# Patient Record
Sex: FEMALE | Race: WHITE | NOT HISPANIC OR LATINO | Employment: OTHER | URBAN - METROPOLITAN AREA
[De-identification: names, ages, dates, MRNs, and addresses within clinical notes are randomized per-mention and may not be internally consistent; named-entity substitution may affect disease eponyms.]

---

## 2023-03-26 DIAGNOSIS — E78.5 HYPERLIPIDEMIA, UNSPECIFIED: ICD-10-CM

## 2023-03-27 RX ORDER — ROSUVASTATIN CALCIUM 5 MG/1
5 TABLET, COATED ORAL DAILY
COMMUNITY
End: 2023-03-28 | Stop reason: SDUPTHER

## 2023-03-28 RX ORDER — ROSUVASTATIN CALCIUM 5 MG/1
TABLET, COATED ORAL
Qty: 90 TABLET | Refills: 3 | Status: SHIPPED | OUTPATIENT
Start: 2023-03-28 | End: 2024-06-10

## 2023-06-24 DIAGNOSIS — I10 PRIMARY HYPERTENSION: Primary | ICD-10-CM

## 2023-06-26 PROBLEM — M67.40 GANGLION, JOINT: Status: ACTIVE | Noted: 2023-06-26

## 2023-06-26 PROBLEM — M25.569 JOINT PAIN, KNEE: Status: RESOLVED | Noted: 2023-06-26 | Resolved: 2023-06-26

## 2023-06-26 PROBLEM — H81.09 MENIERE'S DISEASE: Status: ACTIVE | Noted: 2023-06-26

## 2023-06-26 PROBLEM — H53.143 ASTHENOPIA, BILATERAL: Status: RESOLVED | Noted: 2023-06-26 | Resolved: 2023-06-26

## 2023-06-26 PROBLEM — M75.42 IMPINGEMENT SYNDROME OF LEFT SHOULDER: Status: RESOLVED | Noted: 2023-06-26 | Resolved: 2023-06-26

## 2023-06-26 PROBLEM — M25.462 BILATERAL KNEE SWELLING: Status: RESOLVED | Noted: 2023-06-26 | Resolved: 2023-06-26

## 2023-06-26 PROBLEM — M17.10 OSTEOARTHRITIS, LOWER LEG, LOCALIZED: Status: ACTIVE | Noted: 2023-06-26

## 2023-06-26 PROBLEM — H52.7 REFRACTIVE ERROR: Status: RESOLVED | Noted: 2023-06-26 | Resolved: 2023-06-26

## 2023-06-26 PROBLEM — C50.912 BREAST CANCER, LEFT (MULTI): Status: RESOLVED | Noted: 2023-06-26 | Resolved: 2023-06-26

## 2023-06-26 PROBLEM — R53.83 FATIGUE: Status: ACTIVE | Noted: 2023-06-26

## 2023-06-26 PROBLEM — N63.20 LEFT BREAST MASS: Status: RESOLVED | Noted: 2023-06-26 | Resolved: 2023-06-26

## 2023-06-26 PROBLEM — R04.0 EPISTAXIS: Status: RESOLVED | Noted: 2023-06-26 | Resolved: 2023-06-26

## 2023-06-26 PROBLEM — R07.81 RIB PAIN ON LEFT SIDE: Status: RESOLVED | Noted: 2023-06-26 | Resolved: 2023-06-26

## 2023-06-26 PROBLEM — R73.9 ELEVATED BLOOD SUGAR: Status: ACTIVE | Noted: 2023-06-26

## 2023-06-26 PROBLEM — T75.3XXA MOTION SICKNESS: Status: RESOLVED | Noted: 2023-06-26 | Resolved: 2023-06-26

## 2023-06-26 PROBLEM — C73 PAPILLARY ADENOCARCINOMA OF THYROID (MULTI): Status: RESOLVED | Noted: 2023-06-26 | Resolved: 2023-06-26

## 2023-06-26 PROBLEM — F41.9 ANXIETY: Status: ACTIVE | Noted: 2023-06-26

## 2023-06-26 PROBLEM — J45.909 MILD REACTIVE AIRWAYS DISEASE (HHS-HCC): Status: RESOLVED | Noted: 2023-06-26 | Resolved: 2023-06-26

## 2023-06-26 PROBLEM — M25.519 PAIN, JOINT, SHOULDER: Status: RESOLVED | Noted: 2023-06-26 | Resolved: 2023-06-26

## 2023-06-26 PROBLEM — K02.9 DENTAL DECAY: Status: RESOLVED | Noted: 2023-06-26 | Resolved: 2023-06-26

## 2023-06-26 PROBLEM — D17.9 LIPOMA: Status: RESOLVED | Noted: 2023-06-26 | Resolved: 2023-06-26

## 2023-06-26 PROBLEM — H04.123 BILATERAL DRY EYES: Status: RESOLVED | Noted: 2023-06-26 | Resolved: 2023-06-26

## 2023-06-26 PROBLEM — M25.461 BILATERAL KNEE SWELLING: Status: RESOLVED | Noted: 2023-06-26 | Resolved: 2023-06-26

## 2023-06-26 PROBLEM — E06.3 HASHIMOTO'S THYROIDITIS: Status: ACTIVE | Noted: 2023-06-26

## 2023-06-26 PROBLEM — H52.00 HYPEROPIA WITH PRESBYOPIA: Status: RESOLVED | Noted: 2023-06-26 | Resolved: 2023-06-26

## 2023-06-26 PROBLEM — H52.4 HYPEROPIA WITH PRESBYOPIA: Status: RESOLVED | Noted: 2023-06-26 | Resolved: 2023-06-26

## 2023-06-26 PROBLEM — S39.92XA LOWER BACK INJURY: Status: RESOLVED | Noted: 2023-06-26 | Resolved: 2023-06-26

## 2023-06-26 PROBLEM — M54.16 RIGHT LUMBAR RADICULOPATHY: Status: RESOLVED | Noted: 2023-06-26 | Resolved: 2023-06-26

## 2023-06-26 PROBLEM — H81.01 COCHLEAR HYDROPS, RIGHT: Status: RESOLVED | Noted: 2023-06-26 | Resolved: 2023-06-26

## 2023-06-26 PROBLEM — L08.9 INFECTION, SKIN: Status: RESOLVED | Noted: 2023-06-26 | Resolved: 2023-06-26

## 2023-06-26 PROBLEM — R00.0 TACHYCARDIA: Status: RESOLVED | Noted: 2023-06-26 | Resolved: 2023-06-26

## 2023-06-26 PROBLEM — J45.909 ASTHMA (HHS-HCC): Status: ACTIVE | Noted: 2023-06-26

## 2023-06-26 PROBLEM — G25.89 SCAPULAR DYSKINESIS: Status: RESOLVED | Noted: 2023-06-26 | Resolved: 2023-06-26

## 2023-06-26 PROBLEM — R00.2 PALPITATIONS: Status: ACTIVE | Noted: 2023-06-26

## 2023-06-26 PROBLEM — H92.03 OTALGIA OF BOTH EARS: Status: RESOLVED | Noted: 2023-06-26 | Resolved: 2023-06-26

## 2023-06-26 PROBLEM — A69.20 LYME DISEASE: Status: RESOLVED | Noted: 2023-06-26 | Resolved: 2023-06-26

## 2023-06-26 PROBLEM — D34 THYROID FOLLICULAR ADENOMA: Status: ACTIVE | Noted: 2023-06-26

## 2023-06-26 PROBLEM — E03.9 HYPOTHYROIDISM: Status: ACTIVE | Noted: 2023-06-26

## 2023-06-26 PROBLEM — E78.5 HYPERLIPIDEMIA: Status: ACTIVE | Noted: 2023-06-26

## 2023-06-26 RX ORDER — PROPRANOLOL HYDROCHLORIDE 80 MG/1
80 CAPSULE, EXTENDED RELEASE ORAL DAILY
COMMUNITY
Start: 2023-03-24 | End: 2023-06-26 | Stop reason: SDUPTHER

## 2023-06-26 RX ORDER — LEVOTHYROXINE SODIUM 100 UG/1
100 TABLET ORAL
COMMUNITY
Start: 2023-05-03 | End: 2023-12-11 | Stop reason: WASHOUT

## 2023-06-26 RX ORDER — PROPRANOLOL HYDROCHLORIDE 80 MG/1
CAPSULE, EXTENDED RELEASE ORAL
Qty: 90 CAPSULE | Refills: 3 | Status: SHIPPED | OUTPATIENT
Start: 2023-06-26 | End: 2023-12-05 | Stop reason: SDUPTHER

## 2023-06-26 RX ORDER — HYDROCORTISONE 25 MG/G
CREAM TOPICAL
COMMUNITY
Start: 2022-07-22 | End: 2023-12-11 | Stop reason: WASHOUT

## 2023-06-26 RX ORDER — LIOTHYRONINE SODIUM 5 UG/1
15 TABLET ORAL DAILY
COMMUNITY
Start: 2022-03-03

## 2023-09-21 ENCOUNTER — OFFICE VISIT (OUTPATIENT)
Dept: PRIMARY CARE | Facility: CLINIC | Age: 68
End: 2023-09-21
Payer: MEDICARE

## 2023-09-21 VITALS
OXYGEN SATURATION: 96 % | HEIGHT: 62 IN | SYSTOLIC BLOOD PRESSURE: 118 MMHG | BODY MASS INDEX: 32.39 KG/M2 | WEIGHT: 176 LBS | DIASTOLIC BLOOD PRESSURE: 82 MMHG | TEMPERATURE: 96.5 F | HEART RATE: 86 BPM

## 2023-09-21 DIAGNOSIS — K21.9 GASTROESOPHAGEAL REFLUX DISEASE WITHOUT ESOPHAGITIS: Primary | ICD-10-CM

## 2023-09-21 PROCEDURE — 99212 OFFICE O/P EST SF 10 MIN: CPT | Performed by: INTERNAL MEDICINE

## 2023-09-21 PROCEDURE — 1160F RVW MEDS BY RX/DR IN RCRD: CPT | Performed by: INTERNAL MEDICINE

## 2023-09-21 PROCEDURE — 1159F MED LIST DOCD IN RCRD: CPT | Performed by: INTERNAL MEDICINE

## 2023-09-21 PROCEDURE — 1126F AMNT PAIN NOTED NONE PRSNT: CPT | Performed by: INTERNAL MEDICINE

## 2023-09-21 RX ORDER — OMEPRAZOLE 20 MG/1
20 CAPSULE, DELAYED RELEASE ORAL 2 TIMES DAILY
Qty: 42 CAPSULE | Refills: 0 | Status: SHIPPED | OUTPATIENT
Start: 2023-09-21 | End: 2024-02-13 | Stop reason: WASHOUT

## 2023-09-21 ASSESSMENT — ENCOUNTER SYMPTOMS
FATIGUE: 0
ADENOPATHY: 0
WEAKNESS: 0
HEADACHES: 0
DIARRHEA: 0
SPEECH DIFFICULTY: 0
STRIDOR: 0
SEIZURES: 0
FACIAL ASYMMETRY: 0
HALLUCINATIONS: 0
MYALGIAS: 0
ABDOMINAL DISTENTION: 1
COLOR CHANGE: 0
SINUS PAIN: 0
SHORTNESS OF BREATH: 0
NAUSEA: 0
ACTIVITY CHANGE: 0
CONSTIPATION: 0
TROUBLE SWALLOWING: 0
SORE THROAT: 0
EYE PAIN: 0
BACK PAIN: 0
POLYPHAGIA: 0
APPETITE CHANGE: 0
CHEST TIGHTNESS: 0
ABDOMINAL PAIN: 0
DIZZINESS: 0
FLANK PAIN: 0
VOMITING: 0
PHOTOPHOBIA: 0
COUGH: 0
HEMATURIA: 0
NUMBNESS: 0
NECK PAIN: 0
JOINT SWELLING: 0
POLYDIPSIA: 0
ARTHRALGIAS: 0
WHEEZING: 0
SLEEP DISTURBANCE: 0
PALPITATIONS: 0
VOICE CHANGE: 0
TREMORS: 0
DYSURIA: 0
NERVOUS/ANXIOUS: 0
UNEXPECTED WEIGHT CHANGE: 0
BLOOD IN STOOL: 0
WOUND: 0
FEVER: 0
CONFUSION: 0

## 2023-09-21 NOTE — PROGRESS NOTES
"Subjective   Patient ID: Joe Taylor is a 68 y.o. female who presents for No chief complaint on file..    HPI   Patient presented to the office for the symptoms of GERD and abdominal distension from past several days which is not getting better.    Her symptoms started with joint pain and fever/ chills but other symptoms resolved.    Review of Systems   Constitutional:  Negative for activity change, appetite change, fatigue, fever and unexpected weight change.   HENT:  Negative for dental problem, ear discharge, hearing loss, nosebleeds, postnasal drip, sinus pain, sore throat, trouble swallowing and voice change.    Eyes:  Negative for photophobia, pain and visual disturbance.   Respiratory:  Negative for cough, chest tightness, shortness of breath, wheezing and stridor.    Cardiovascular:  Negative for chest pain, palpitations and leg swelling.   Gastrointestinal:  Positive for abdominal distention. Negative for abdominal pain, blood in stool, constipation, diarrhea, nausea and vomiting.   Endocrine: Negative for polydipsia, polyphagia and polyuria.   Genitourinary:  Negative for decreased urine volume, dyspareunia, dysuria, flank pain, hematuria and urgency.   Musculoskeletal:  Negative for arthralgias, back pain, gait problem, joint swelling, myalgias and neck pain.   Skin:  Negative for color change, rash and wound.   Allergic/Immunologic: Negative for environmental allergies and food allergies.   Neurological:  Negative for dizziness, tremors, seizures, syncope, facial asymmetry, speech difficulty, weakness, numbness and headaches.   Hematological:  Negative for adenopathy.   Psychiatric/Behavioral:  Negative for behavioral problems, confusion, hallucinations, self-injury, sleep disturbance and suicidal ideas. The patient is not nervous/anxious.      Objective   /82   Pulse 86   Temp 35.8 °C (96.5 °F)   Ht 1.575 m (5' 2\")   Wt 79.8 kg (176 lb)   SpO2 96%   BMI 32.19 kg/m²     Physical " Exam  Constitutional:       General: She is not in acute distress.     Appearance: Normal appearance. She is not ill-appearing or toxic-appearing.   Pulmonary:      Effort: Pulmonary effort is normal.   Abdominal:      General: Bowel sounds are normal.   Musculoskeletal:         General: Normal range of motion.      Cervical back: Normal range of motion.   Skin:     General: Skin is warm.   Neurological:      General: No focal deficit present.      Mental Status: She is alert and oriented to person, place, and time.   Psychiatric:         Mood and Affect: Mood normal.         Behavior: Behavior normal.         Thought Content: Thought content normal.         Judgment: Judgment normal.       Assessment/Plan   Problem List Items Addressed This Visit    None  Visit Diagnoses       Gastroesophageal reflux disease without esophagitis    -  Primary    Relevant Medications    omeprazole (PriLOSEC) 20 mg DR capsule

## 2023-09-29 ENCOUNTER — APPOINTMENT (OUTPATIENT)
Dept: PRIMARY CARE | Facility: CLINIC | Age: 68
End: 2023-09-29
Payer: MEDICARE

## 2023-10-02 ENCOUNTER — APPOINTMENT (OUTPATIENT)
Dept: PRIMARY CARE | Facility: CLINIC | Age: 68
End: 2023-10-02
Payer: MEDICARE

## 2023-11-16 NOTE — PROGRESS NOTES
Oncology Follow-Up    Joe Taylor  [id number]       Breast         AJCC Edition: 8th (AJCC), Diagnosis Date: 13-Mar-2020, IA, pT2 pN0 cM0 G2  Oncology History    No history exists.     Treatment Synopsis:    65-year-old postmenopausal  female with left-sided invasive ductal carcinoma, stage IA (T1b N0 M0). The patient's breast cancer was diagnosed on March 13, 2020, and is estrogen receptor positive at >95%, progesterone receptor positive at 90%, and HER-2/chhaya equivocal, her2 not amplified by FISH at 1.7. In addition patient has an oncotype DX recurrence score of 14 translating to a 9-year distant recurrence  rate of 4% with <1% chemotherapy benefit. Details of her history are as follows.      02/21/20220: Bilateral screening mammogram. Showed new spiculated left breast mass.   03/13/2020: Left mammogram with US at 3:00, 9 cm from the nipple, was a 0.5 x 0.6 x 0.7cm mass. Left axilla showed one abnormal looking LN  measuring 2.4 x 0.7 x 1.9cm mass.   03/13/2020: US guided bio[psy of the left breast at 3:00, 9cm from the nipple. Pathology revealed invasive ductal carcinoma with receptors as above. Left axillary LN biopsy did not reveal any malignancy   04/08/2020: Patient completed a left partial mastectomy with SLNB. Pathology showed a 2.3cm mass, grade 2, with 0/2 SLNs involved.   06/16/2020: Completed radiation   07/01/2020: Started Arimidex      Subjective    Joe presents for her Initial survivorship visit. She was last seen in medical oncology in June 2021 though continued follow up at [facility]. Her oncologist left and was referred to me by Dr. Bindu Matias. Joe has been following with surgical oncology and primary care. Joe has been confused about her follow up visits and wants to get on a schedule where she follows up. She continues on anastrozole and asks if she can take it every other day. She sees Dr. Mack in primary care and will be seeing Dr. Varma in  endocrinology about an elevated calcium level that she has had for quite some times. Joe does monthly Breast self exams though is unsure what she is looking for. She has tenderness in her left lateral breast and lower axilla. She reports SOB due to asthma, she also saw dermatology last year with a negative exam. She will be seeing Dr. Wise in gynecology to establish care since her GYN retired 2 years ago. Joe states her  has trigeminal neuralgia. He is very dependant on her and this has been difficult for her. Joe is observed to be very anxious as she twists her hair almost continually and talks very fast about issues that happened years ago. Joe is not exercising though is considering joining a [employer] class close to her home as her [employer] class is too far and they were not compliant with Covid protocols. Greta denies any unusual headaches, balance issues, depression, cough, shortness of breath (other then mentioned above), problems swallowing, changes in chest/breast area, abdominal pain, bone or muscle pain, vaginal bleeding, rectal bleeding, blood in the urine, vaginal dryness, swelling arms or legs, new or unusual skin moles or lesions. Joe states she has gained weight in her stomach.         Objective      Vitals:    11/17/23 0853   BP: 126/83   Pulse: 82   Resp: 18   Temp: 36.5 °C (97.7 °F)   SpO2: 99%        Constitutional: Well developed, alert/oriented x3, no distress, cooperative   Eyes: clear sclera   ENMT: mucous membranes moist, no apparent lesions   Head/Neck: Neck supple, no bruits   Respiratory/Thorax: Patent airways, normal breath sounds with good chest expansion   Cardiovascular: Regular rate and rhythm, no murmurs, 2+ equal pulses of the extremities,   Gastrointestinal: Nondistended, soft, non-tender, no masses palpable, no organomegaly   Musculoskeletal: ROM intact, no joint swelling, normal strength   Extremities: normal extremities, no edema,  cyanosis, contusions or wounds   Neurological: alert and oriented x3,  normal strength   Breast:     Lymphatic: No significant lymphadenopathy   Psychological: Appropriate mood and behavior   Skin: Warm and dry, no lesions, no rashes. She does have multiple cysts though most likely fibroadenomas; the 2 that I palpated were at the center of her lower back. They are tender on palpation.      Physical Exam  Chest:          Comments: Left breast + for breast conserving surgery with well healed UOQ and left axillary incisions; no masses, nodules, skin changes, discharge. Right breast without masses, nodules, skin changes, discharge.          Lab Results   Component Value Date    WBC 3.9 (L) 11/04/2022    HGB 14.0 11/04/2022    HCT 42.3 11/04/2022    MCV 86 11/04/2022     11/04/2022       Chemistry    Lab Results   Component Value Date/Time     11/04/2022 1023    K 4.4 11/04/2022 1023     11/04/2022 1023    CO2 28 11/04/2022 1023    BUN 24 (H) 11/04/2022 1023    CREATININE 0.71 11/04/2022 1023    Lab Results   Component Value Date/Time    CALCIUM 9.9 11/04/2022 1023    ALKPHOS 109 11/04/2022 1023    AST 23 11/04/2022 1023    ALT 29 11/04/2022 1023    BILITOT 1.0 11/04/2022 1023              Imaging:  MRN: [id number]  Patient Name: YESICA LAGUNAS     STUDY:  DIGITAL DIAG MAMM BILAT WITH MAEVE;  5/19/2023 11:12 am     ACCESSION NUMBER(S):  [id number]     ORDERING CLINICIAN:  DRU MARTINEZ     INDICATION:  Annual. History of left lumpectomy with radiation.     COMPARISON:  05/04/2022, 04/14/2021, 02/21/2020, 12/12/2017     FINDINGS:  MAMMOGRAPHY: 2D and tomosynthesis images were reviewed at 1 mm slice  thickness.     The breast tissue is heterogeneously dense, which may obscure small  masses.  There is stable parenchymal scarring with associated  surgical clips in the upper outer left breast at posterior depth from  prior lumpectomy. Mild left breast skin and trabecular thickening is  present consistent with  "prior radiation therapy. Stable parenchymal  scarring is also present in the left axilla. No suspicious masses or  calcifications are identified in either breast.     IMPRESSION:  Stable left breast post treatment changes. No mammographic evidence  of malignancy.     BI-RADS CATEGORY:     Category: 2 - Benign.  Recommendation: 1 Year Screening        Assessment/Plan    Joe is a 67 yo woman with a hx of T2N0 left IDC diagnosed in March 2020. She is s/p partial mastectomy and SLNB, XRT and initiated anastrozole in July 2020.   There are no diagnoses linked to this encounter.  Plan:  Exam is negative.  Continue anastrozole 1mg daily. Clinical trials were based on 5 or 10 years of daily use. She should take it every day.  The \"lumps\" seem to be fibroadenomas, explained that they are fatty tissue. Please have Dr. Mack examine them as well, or Dr. Matias at your next visit. I don't think there is anything to do for them unless they become very big.  Discuss your osteopenia with Dr. Varma at your upcoming visit. We can consider treating the osteopenia with Zometa, the reasons were discussed, or, Dr. Varma may want further testing to find the cause of the hypercalcemia.  We discussed her situation with her  and a co-dependant relationship. I recommended therapy and referred her to [facility].   Explained the reason for post-menonpausal weight gain which is due to smaller amount of estrogen and muscles relax due to this which can cause a paunch.   Encouraged her to join an exercise class 2-3 times/week as she has had success with that in the past.   Recommended updating her shingles vaccines. She plans to have her flu shot with Dr. Mack at her appointment next month. She should consider updating her Covid vaccine; I did explain why she didn't feel well after her last two injections.  Encouraged monthly breast self exams, plant based diet, keep alcohol <3 drinks/week, exercise at least 2.5 " hours/week.  We reviewed signs/symptoms of recurrence including new masses, new pigmented lesion, tugging or pulling of the skin, nipple discharge, rash in or around the chest area, or any new finding that doesn't resolve within a 2-3 weeks.  All of Joe's questions/concerns were addressed.  Over 35 minutes of time was spent with this patient with >50% of the time with education, counseling, and coordination of care.  She will see Dr. Matias in late May. I will see her back in one year.         Theresa Monroe, APRN-CNP

## 2023-11-17 ENCOUNTER — OFFICE VISIT (OUTPATIENT)
Dept: HEMATOLOGY/ONCOLOGY | Facility: CLINIC | Age: 68
End: 2023-11-17
Payer: MEDICARE

## 2023-11-17 VITALS
DIASTOLIC BLOOD PRESSURE: 83 MMHG | RESPIRATION RATE: 18 BRPM | OXYGEN SATURATION: 99 % | TEMPERATURE: 97.7 F | BODY MASS INDEX: 32.18 KG/M2 | HEART RATE: 82 BPM | WEIGHT: 175.93 LBS | SYSTOLIC BLOOD PRESSURE: 126 MMHG

## 2023-11-17 DIAGNOSIS — Z85.3 HISTORY OF RIGHT BREAST CANCER: Primary | ICD-10-CM

## 2023-11-17 PROCEDURE — 99417 PROLNG OP E/M EACH 15 MIN: CPT | Performed by: NURSE PRACTITIONER

## 2023-11-17 PROCEDURE — 99215 OFFICE O/P EST HI 40 MIN: CPT | Performed by: NURSE PRACTITIONER

## 2023-11-17 PROCEDURE — 1160F RVW MEDS BY RX/DR IN RCRD: CPT | Performed by: NURSE PRACTITIONER

## 2023-11-17 PROCEDURE — 1159F MED LIST DOCD IN RCRD: CPT | Performed by: NURSE PRACTITIONER

## 2023-11-17 PROCEDURE — 1036F TOBACCO NON-USER: CPT | Performed by: NURSE PRACTITIONER

## 2023-11-17 PROCEDURE — 1126F AMNT PAIN NOTED NONE PRSNT: CPT | Performed by: NURSE PRACTITIONER

## 2023-11-17 RX ORDER — ANASTROZOLE 1 MG/1
1 TABLET ORAL DAILY
COMMUNITY
End: 2024-01-31 | Stop reason: SDUPTHER

## 2023-11-17 ASSESSMENT — PAIN SCALES - GENERAL: PAINLEVEL: 0-NO PAIN

## 2023-11-17 ASSESSMENT — ENCOUNTER SYMPTOMS
OCCASIONAL FEELINGS OF UNSTEADINESS: 0
DEPRESSION: 0
LOSS OF SENSATION IN FEET: 0

## 2023-11-17 NOTE — PATIENT INSTRUCTIONS
1. Exercise 2.5 hours per week; bone strengthening, cardio-vascular, resistance training.  2. Please do self breast exams monthly.  3. Keep alcohol under 3 drinks per week.  4. Sun safety - limit sun exposure from 11a-2p when its at its hottest, apply 15-30 sun block and re-apply every 1-2 hours if perspiring or swimming.  5. Eat a plant based diet, add in oily fishes such as mackerel, tuna, and salmon.  6. Get in at least 1,000 mg of calcium per day through diet or supplement for bone strength. Examples of foods higher in calcium are milk, yogurt, fruited yogurt, oranges, fortified orange juice, almonds, almond milk, broccoli, spinach, bok courtney, mustard greens, puddings, custards, ice cream, fortified cereals, bars, and crackers.   7. Continue anastrozole 1mg daily.  8. See Dr. Varma about your elevated calcium level and osteopenia. I do recommend treatment with Zometa infusions twice yearly.   9. Consider seeing Elis Rapp for counseling.  10. Please call the office if any new mass or rash in or around breast, or any uncontrolled symptoms that last over 2-3 weeks at [phone].  11. Have your vaccines updated.  12. See Dr. Matias with your mammogram in late May. I will see you back in one year.   10. It was nice meeting you today, bonny.   Have a Happy, Healthy, Holiday Season!  Thank you for choosing [facility] for your care.

## 2023-11-24 ENCOUNTER — APPOINTMENT (OUTPATIENT)
Dept: OBSTETRICS AND GYNECOLOGY | Facility: CLINIC | Age: 68
End: 2023-11-24
Payer: MEDICARE

## 2023-11-30 ENCOUNTER — APPOINTMENT (OUTPATIENT)
Dept: PRIMARY CARE | Facility: CLINIC | Age: 68
End: 2023-11-30
Payer: MEDICARE

## 2023-12-05 ENCOUNTER — OFFICE VISIT (OUTPATIENT)
Dept: PRIMARY CARE | Facility: CLINIC | Age: 68
End: 2023-12-05
Payer: MEDICARE

## 2023-12-05 ENCOUNTER — TELEPHONE (OUTPATIENT)
Dept: PRIMARY CARE | Facility: CLINIC | Age: 68
End: 2023-12-05

## 2023-12-05 VITALS
SYSTOLIC BLOOD PRESSURE: 140 MMHG | DIASTOLIC BLOOD PRESSURE: 80 MMHG | WEIGHT: 175 LBS | TEMPERATURE: 96.6 F | OXYGEN SATURATION: 99 % | HEIGHT: 62 IN | BODY MASS INDEX: 32.2 KG/M2 | HEART RATE: 90 BPM

## 2023-12-05 DIAGNOSIS — R00.2 PALPITATIONS: ICD-10-CM

## 2023-12-05 DIAGNOSIS — E03.9 ACQUIRED HYPOTHYROIDISM: ICD-10-CM

## 2023-12-05 DIAGNOSIS — I10 PRIMARY HYPERTENSION: ICD-10-CM

## 2023-12-05 DIAGNOSIS — Z00.00 ROUTINE GENERAL MEDICAL EXAMINATION AT HEALTH CARE FACILITY: ICD-10-CM

## 2023-12-05 DIAGNOSIS — D34 THYROID FOLLICULAR ADENOMA: ICD-10-CM

## 2023-12-05 DIAGNOSIS — Z00.00 ROUTINE GENERAL MEDICAL EXAMINATION AT A HEALTH CARE FACILITY: Primary | ICD-10-CM

## 2023-12-05 PROCEDURE — 99213 OFFICE O/P EST LOW 20 MIN: CPT | Performed by: FAMILY MEDICINE

## 2023-12-05 PROCEDURE — G0439 PPPS, SUBSEQ VISIT: HCPCS | Performed by: FAMILY MEDICINE

## 2023-12-05 PROCEDURE — 1170F FXNL STATUS ASSESSED: CPT | Performed by: FAMILY MEDICINE

## 2023-12-05 PROCEDURE — 1160F RVW MEDS BY RX/DR IN RCRD: CPT | Performed by: FAMILY MEDICINE

## 2023-12-05 PROCEDURE — 3079F DIAST BP 80-89 MM HG: CPT | Performed by: FAMILY MEDICINE

## 2023-12-05 PROCEDURE — 1159F MED LIST DOCD IN RCRD: CPT | Performed by: FAMILY MEDICINE

## 2023-12-05 PROCEDURE — 1036F TOBACCO NON-USER: CPT | Performed by: FAMILY MEDICINE

## 2023-12-05 PROCEDURE — 1126F AMNT PAIN NOTED NONE PRSNT: CPT | Performed by: FAMILY MEDICINE

## 2023-12-05 PROCEDURE — 3077F SYST BP >= 140 MM HG: CPT | Performed by: FAMILY MEDICINE

## 2023-12-05 RX ORDER — ALPRAZOLAM 0.25 MG/1
0.25 TABLET ORAL 3 TIMES DAILY PRN
Qty: 30 TABLET | Refills: 5 | Status: SHIPPED | OUTPATIENT
Start: 2023-12-05 | End: 2023-12-11 | Stop reason: WASHOUT

## 2023-12-05 RX ORDER — ALPRAZOLAM 0.25 MG/1
0.25 TABLET ORAL 3 TIMES DAILY PRN
Qty: 30 TABLET | Refills: 5 | Status: SHIPPED | OUTPATIENT
Start: 2023-12-05 | End: 2023-12-05 | Stop reason: SDUPTHER

## 2023-12-05 RX ORDER — PROPRANOLOL HYDROCHLORIDE 80 MG/1
80 CAPSULE, EXTENDED RELEASE ORAL DAILY
Qty: 90 CAPSULE | Refills: 3 | Status: SHIPPED | OUTPATIENT
Start: 2023-12-05 | End: 2023-12-05 | Stop reason: SDUPTHER

## 2023-12-05 RX ORDER — PROPRANOLOL HYDROCHLORIDE 80 MG/1
80 CAPSULE, EXTENDED RELEASE ORAL DAILY
Qty: 90 CAPSULE | Refills: 3 | Status: SHIPPED | OUTPATIENT
Start: 2023-12-05 | End: 2024-06-10

## 2023-12-05 ASSESSMENT — ENCOUNTER SYMPTOMS
FREQUENCY: 0
UNEXPECTED WEIGHT CHANGE: 0
SHORTNESS OF BREATH: 0
JOINT SWELLING: 0
VOMITING: 0
FEVER: 0
BLOOD IN STOOL: 0
PALPITATIONS: 1
DIARRHEA: 0
EYE PAIN: 0
HALLUCINATIONS: 0
DYSURIA: 0
FATIGUE: 0
HEMATURIA: 0
DECREASED CONCENTRATION: 0
TROUBLE SWALLOWING: 0
ABDOMINAL PAIN: 0
NAUSEA: 0
NUMBNESS: 0
DIZZINESS: 0
CONFUSION: 0
WEAKNESS: 0
SORE THROAT: 0
COUGH: 0
HEADACHES: 0

## 2023-12-05 ASSESSMENT — ACTIVITIES OF DAILY LIVING (ADL)
GROCERY_SHOPPING: INDEPENDENT
BATHING: INDEPENDENT
MANAGING_FINANCES: INDEPENDENT
TAKING_MEDICATION: INDEPENDENT
DRESSING: INDEPENDENT
DOING_HOUSEWORK: INDEPENDENT

## 2023-12-05 NOTE — PATIENT INSTRUCTIONS
It was nice to see you today!  Discussed current concerns and addressed   Reviewed recent labs and diagnostics  Reviewed medications list  Continue to eat a healthy diet, exercise at least 3 times a week or more  Plan and follow up discussed  For any further information related to your condition, copy and paste or go to [email or web address]    We are going to hold off on vaccine for now, flu shot.  I am going to recheck her thyroid studies and if abnormal we will notify her endocrinologist.  The fast heart rate is coinciding with the new propranolol generic.  I will see if [facility] can get her old formulation if not she may want to go to [facility] before we try other things.  I will give her prescription of Xanax to help her sleep and calm her down.  I am going to get her back into Dr. Rasmussen to see if she needs further restratification.  Denies anxiety outside of her normal everyday routine with her  that has indecipherable trigeminal neuralgia.  Will get labs that endocrine did not get.

## 2023-12-05 NOTE — PROGRESS NOTES
Subjective   Reason for Visit: Joe Taylor is an 68 y.o. female here for a Medicare Wellness visit.          Reviewed all medications by prescribing practitioner or clinical pharmacist (such as prescriptions, OTCs, herbal therapies and supplements) and documented in the medical record.    Patient is due for Medicare wellness and highlights and necessary items were addressed but her big issue is that she has been having what she calls a racing heart.  She can feel it beating intensely for the past week or so.  She has a history of thyroid cancer and her endocrinologist runs her high.  They lowered her dose on October 6 when her TSH was suppressed.  She was doing fine until she picked up a new formulation of propranolol at a new pharmacy.  The pill was different.  Whether coincidental or not she has not felt well since then.  The palpitations/racing heart wakes her up out of sleep.  She is under no more stress than she has been in the past.  Her hands have been a little tingly.  She saw cardiology a few years ago and has not been back.  She has no chest pain.  She is not a smoker.  She does not drink.        Patient Care Team:  Elise Mack MD as PCP - General  Elise Mack MD as PCP - [employer] PCP     Review of Systems   Constitutional:  Negative for fatigue, fever and unexpected weight change.   HENT:  Negative for congestion, ear pain, hearing loss, sore throat and trouble swallowing.    Eyes:  Negative for pain and visual disturbance.   Respiratory:  Negative for cough and shortness of breath.    Cardiovascular:  Positive for palpitations. Negative for chest pain and leg swelling.   Gastrointestinal:  Negative for abdominal pain, blood in stool, diarrhea, nausea and vomiting.   Genitourinary:  Negative for dysuria, frequency, hematuria and urgency.   Musculoskeletal:  Negative for joint swelling.   Skin:  Negative for pallor and rash.   Neurological:  Negative for dizziness,  "syncope, weakness, numbness and headaches.   Psychiatric/Behavioral:  Negative for confusion, decreased concentration, hallucinations and suicidal ideas.        Objective   Vitals:  /80   Pulse 90   Temp 35.9 °C (96.6 °F)   Ht 1.575 m (5' 2\")   Wt 79.4 kg (175 lb)   SpO2 99%   BMI 32.01 kg/m²       Physical Exam  Constitutional:       Appearance: Normal appearance.   Cardiovascular:      Rate and Rhythm: Normal rate and regular rhythm.      Heart sounds: Normal heart sounds.      Comments: Heart is regular, it is an the 90s.  Pulmonary:      Effort: Pulmonary effort is normal.      Breath sounds: Normal breath sounds.   Musculoskeletal:      Cervical back: Neck supple.   Skin:     General: Skin is warm and dry.   Neurological:      General: No focal deficit present.      Mental Status: She is alert.   Psychiatric:         Mood and Affect: Mood normal.         Speech: Speech normal.         Behavior: Behavior normal.         Cognition and Memory: Cognition normal.         Assessment/Plan   Problem List Items Addressed This Visit       Hypothyroidism    Relevant Orders    TSH    T3, free    T4, free    Palpitations    Relevant Orders    Referral to Cardiology    Thyroid follicular adenoma - Primary    Relevant Orders    CBC and Auto Differential     Other Visit Diagnoses       Primary hypertension        Relevant Orders    Lipid panel               "

## 2023-12-06 ENCOUNTER — LAB (OUTPATIENT)
Dept: LAB | Facility: LAB | Age: 68
End: 2023-12-06
Payer: MEDICARE

## 2023-12-06 ENCOUNTER — APPOINTMENT (OUTPATIENT)
Dept: CARDIOLOGY | Facility: HOSPITAL | Age: 68
End: 2023-12-06
Payer: MEDICARE

## 2023-12-06 DIAGNOSIS — I10 PRIMARY HYPERTENSION: ICD-10-CM

## 2023-12-06 DIAGNOSIS — E03.9 ACQUIRED HYPOTHYROIDISM: ICD-10-CM

## 2023-12-06 DIAGNOSIS — D34 THYROID FOLLICULAR ADENOMA: ICD-10-CM

## 2023-12-06 LAB
BASOPHILS # BLD AUTO: 0.03 X10*3/UL (ref 0–0.1)
BASOPHILS NFR BLD AUTO: 0.7 %
CHOLEST SERPL-MCNC: 154 MG/DL (ref 0–199)
CHOLESTEROL/HDL RATIO: 2.7
EOSINOPHIL # BLD AUTO: 0.15 X10*3/UL (ref 0–0.7)
EOSINOPHIL NFR BLD AUTO: 3.5 %
ERYTHROCYTE [DISTWIDTH] IN BLOOD BY AUTOMATED COUNT: 14.6 % (ref 11.5–14.5)
HCT VFR BLD AUTO: 43 % (ref 36–46)
HDLC SERPL-MCNC: 57.5 MG/DL
HGB BLD-MCNC: 13.6 G/DL (ref 12–16)
IMM GRANULOCYTES # BLD AUTO: 0.01 X10*3/UL (ref 0–0.7)
IMM GRANULOCYTES NFR BLD AUTO: 0.2 % (ref 0–0.9)
LDLC SERPL CALC-MCNC: 82 MG/DL
LYMPHOCYTES # BLD AUTO: 1.29 X10*3/UL (ref 1.2–4.8)
LYMPHOCYTES NFR BLD AUTO: 30.1 %
MCH RBC QN AUTO: 27.3 PG (ref 26–34)
MCHC RBC AUTO-ENTMCNC: 31.6 G/DL (ref 32–36)
MCV RBC AUTO: 86 FL (ref 80–100)
MONOCYTES # BLD AUTO: 0.4 X10*3/UL (ref 0.1–1)
MONOCYTES NFR BLD AUTO: 9.3 %
NEUTROPHILS # BLD AUTO: 2.4 X10*3/UL (ref 1.2–7.7)
NEUTROPHILS NFR BLD AUTO: 56.2 %
NON HDL CHOLESTEROL: 97 MG/DL (ref 0–149)
NRBC BLD-RTO: 0 /100 WBCS (ref 0–0)
PLATELET # BLD AUTO: 223 X10*3/UL (ref 150–450)
RBC # BLD AUTO: 4.98 X10*6/UL (ref 4–5.2)
T3FREE SERPL-MCNC: 4.3 PG/ML (ref 2.3–4.2)
T4 FREE SERPL-MCNC: 0.82 NG/DL (ref 0.61–1.12)
TRIGL SERPL-MCNC: 75 MG/DL (ref 0–149)
TSH SERPL-ACNC: 0.2 MIU/L (ref 0.44–3.98)
VLDL: 15 MG/DL (ref 0–40)
WBC # BLD AUTO: 4.3 X10*3/UL (ref 4.4–11.3)

## 2023-12-06 PROCEDURE — 85025 COMPLETE CBC W/AUTO DIFF WBC: CPT

## 2023-12-06 PROCEDURE — 84439 ASSAY OF FREE THYROXINE: CPT

## 2023-12-06 PROCEDURE — 84443 ASSAY THYROID STIM HORMONE: CPT

## 2023-12-06 PROCEDURE — 84481 FREE ASSAY (FT-3): CPT

## 2023-12-06 PROCEDURE — 80061 LIPID PANEL: CPT

## 2023-12-06 PROCEDURE — 36415 COLL VENOUS BLD VENIPUNCTURE: CPT

## 2023-12-07 ENCOUNTER — APPOINTMENT (OUTPATIENT)
Dept: PRIMARY CARE | Facility: CLINIC | Age: 68
End: 2023-12-07
Payer: MEDICARE

## 2023-12-11 ENCOUNTER — OFFICE VISIT (OUTPATIENT)
Dept: CARDIOLOGY | Facility: HOSPITAL | Age: 68
End: 2023-12-11
Payer: MEDICARE

## 2023-12-11 VITALS
OXYGEN SATURATION: 94 % | WEIGHT: 176.37 LBS | BODY MASS INDEX: 32.26 KG/M2 | HEART RATE: 88 BPM | DIASTOLIC BLOOD PRESSURE: 86 MMHG | SYSTOLIC BLOOD PRESSURE: 134 MMHG

## 2023-12-11 DIAGNOSIS — R09.89 BRUIT OF LEFT CAROTID ARTERY: ICD-10-CM

## 2023-12-11 DIAGNOSIS — R06.02 SHORTNESS OF BREATH: ICD-10-CM

## 2023-12-11 DIAGNOSIS — R00.2 PALPITATIONS: Primary | ICD-10-CM

## 2023-12-11 DIAGNOSIS — R00.2 HEART PALPITATIONS: ICD-10-CM

## 2023-12-11 PROCEDURE — 1126F AMNT PAIN NOTED NONE PRSNT: CPT | Performed by: NURSE PRACTITIONER

## 2023-12-11 PROCEDURE — 99214 OFFICE O/P EST MOD 30 MIN: CPT | Mod: 25 | Performed by: NURSE PRACTITIONER

## 2023-12-11 PROCEDURE — 93010 ELECTROCARDIOGRAM REPORT: CPT | Performed by: INTERNAL MEDICINE

## 2023-12-11 PROCEDURE — 1160F RVW MEDS BY RX/DR IN RCRD: CPT | Performed by: NURSE PRACTITIONER

## 2023-12-11 PROCEDURE — 1036F TOBACCO NON-USER: CPT | Performed by: NURSE PRACTITIONER

## 2023-12-11 PROCEDURE — 1159F MED LIST DOCD IN RCRD: CPT | Performed by: NURSE PRACTITIONER

## 2023-12-11 PROCEDURE — 99204 OFFICE O/P NEW MOD 45 MIN: CPT | Performed by: NURSE PRACTITIONER

## 2023-12-11 PROCEDURE — 93005 ELECTROCARDIOGRAM TRACING: CPT | Performed by: NURSE PRACTITIONER

## 2023-12-11 RX ORDER — PNV NO.95/FERROUS FUM/FOLIC AC 28MG-0.8MG
400 TABLET ORAL DAILY
COMMUNITY
End: 2024-02-12 | Stop reason: WASHOUT

## 2023-12-11 RX ORDER — MULTIVIT WITH MINERALS/HERBS
1 TABLET ORAL DAILY
COMMUNITY

## 2023-12-11 RX ORDER — ERGOCALCIFEROL 1.25 MG/1
1 CAPSULE ORAL
COMMUNITY
Start: 2018-04-04 | End: 2024-02-12

## 2023-12-11 RX ORDER — LEVOTHYROXINE SODIUM 75 UG/1
75 TABLET ORAL DAILY
COMMUNITY

## 2023-12-11 RX ORDER — AZELASTINE 1 MG/ML
2 SPRAY, METERED NASAL 2 TIMES DAILY
COMMUNITY
Start: 2022-08-09 | End: 2024-02-12

## 2023-12-11 ASSESSMENT — ENCOUNTER SYMPTOMS
PSYCHIATRIC NEGATIVE: 1
EYES NEGATIVE: 1
GASTROINTESTINAL NEGATIVE: 1
DEPRESSION: 0
HEMATOLOGIC/LYMPHATIC NEGATIVE: 1
RESPIRATORY NEGATIVE: 1
ENDOCRINE NEGATIVE: 1
NEUROLOGICAL NEGATIVE: 1
CONSTITUTIONAL NEGATIVE: 1
CARDIOVASCULAR NEGATIVE: 1

## 2023-12-11 NOTE — PROGRESS NOTES
Referred by Dr. Key ref. provider found for New Patient Visit (Establish care with new cardiologist.) and Rapid Heart Rate     History Of Present Illness:    Dear Dr. Mack,    I had the pleasure of meeting Mrs. Taylor today at [facility] for evaluation of heart palpitations. The patient is seen in collaboration with Dr. Hilario. Mrs. Taylor is a very pleasant 68 year old female with a history of GERD, appendectomy, thyroidectomy, and HLD, she denies history of smoking. Mom had a history of CVA, and dad had a history of carotid artery stenosis. She feels her heart is racing often, will notice all day. She will wake up with a racing heart. She has noticed the color of her Propanolol tablet has changed color. She has tried a higher dose of Propanol and was unable to tolerate it. She has tried Metoprolol and Atenolol in the past, did not work. 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 States she has increased stress at home caring for her .     Review of Systems   Constitutional: Negative.   HENT: Negative.     Eyes: Negative.    Cardiovascular: Negative.    Respiratory: Negative.     Endocrine: Negative.    Hematologic/Lymphatic: Negative.    Skin: Negative.    Gastrointestinal: Negative.    Neurological: Negative.    Psychiatric/Behavioral: Negative.          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), Disorder of thyroid, unspecified, Epistaxis (06/26/2023), Hyperopia with presbyopia (06/26/2023), Impingement syndrome of left shoulder (06/26/2023), Infection, skin (06/26/2023), Joint pain, knee (06/26/2023), Left breast mass (06/26/2023), Lipoma (06/26/2023), Mild reactive airways disease (06/26/2023), Motion sickness (06/26/2023), Otalgia of both ears (06/26/2023), Other conditions influencing health status,  Other conditions influencing health status, Pain, joint, shoulder (06/26/2023), 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    Past Surgical History:  She has a past surgical history that includes Appendectomy (11/22/2013); Other surgical history (11/22/2013); and Total thyroidectomy (03/26/2014).      Social History:  She reports that she has never smoked. She has never used smokeless tobacco. She reports that she does not currently use alcohol. She reports that she does not use drugs.    Family History:  Family History   Problem Relation Name Age of Onset    Stroke Mother      Aneurysm Father          Allergies:  Sulfa (sulfonamide antibiotics), Codeine, Mold, and Tree and shrub pollen    Outpatient Medications:  Current Outpatient Medications   Medication Instructions    alpha tocopherol (VITAMIN E) 400 Units, oral, Daily    anastrozole (ARIMIDEX) 1 mg, oral, Daily, Swallow whole with a drink of water.    azelastine (Astelin) 137 mcg (0.1 %) nasal spray 2 sprays, Each Nostril, 2 times daily    ergocalciferol (Vitamin D-2) 1.25 MG (27774 UT) capsule 1 capsule, oral, Weekly    levothyroxine (SYNTHROID, LEVOXYL) 75 mcg, oral, Daily    liothyronine (CYTOMEL) 15 mcg, oral, Daily    omeprazole (PRILOSEC) 20 mg, oral, 2 times daily, Do not crush or chew.    propranolol LA (INDERAL LA) 80 mg, oral, Daily, Do not crush, chew, or split.    rosuvastatin (Crestor) 5 mg tablet TAKE 1 TABLET BY MOUTH EVERY DAY    vitamin B complex (B Complex-Vitamin B12) tablet 1 tablet, oral,  Daily        Last Recorded Vitals:  Vitals:    12/11/23 1506   BP: 134/86   Pulse: 88   SpO2: 94%   Weight: 80 kg (176 lb 5.9 oz)       Physical Exam:  Physical Exam  Vitals reviewed.   HENT:      Head: Normocephalic.      Nose: Nose normal.   Eyes:      Pupils: Pupils are equal, round, and reactive to light.   Cardiovascular:      Rate and Rhythm: Normal rate and regular rhythm.   Pulmonary:      Effort: Pulmonary effort is normal.      Breath sounds: Normal breath sounds.   Abdominal:      General: Abdomen is flat.      Palpations: Abdomen is soft.   Musculoskeletal:         General: Normal range of motion.      Cervical back: Normal range of motion.   Skin:     General: Skin is warm and dry.   Neurological:      General: No focal deficit present.      Mental Status: He is alert and oriented to person, place, and time.   Psychiatric:         Mood and Affect: Mood normal.            Last Labs:  CBC -  Lab Results   Component Value Date    WBC 4.3 (L) 12/06/2023    HGB 13.6 12/06/2023    HCT 43.0 12/06/2023    MCV 86 12/06/2023     12/06/2023       CMP -  Lab Results   Component Value Date    CALCIUM 9.9 11/04/2022    PHOS 4.1 11/15/2017    PROT 7.4 11/04/2022    ALBUMIN 4.6 11/04/2022    AST 23 11/04/2022    ALT 29 11/04/2022    ALKPHOS 109 11/04/2022    BILITOT 1.0 11/04/2022       LIPID PANEL -   Lab Results   Component Value Date    CHOL 154 12/06/2023    TRIG 75 12/06/2023    HDL 57.5 12/06/2023    CHHDL 2.7 12/06/2023    LDLF 98 11/04/2022    VLDL 15 12/06/2023    NHDL 97 12/06/2023       RENAL FUNCTION PANEL -   Lab Results   Component Value Date    GLUCOSE 106 (H) 11/04/2022     11/04/2022    K 4.4 11/04/2022     11/04/2022    CO2 28 11/04/2022    ANIONGAP 12 11/04/2022    BUN 24 (H) 11/04/2022    CREATININE 0.71 11/04/2022    CALCIUM 9.9 11/04/2022    PHOS 4.1 11/15/2017    ALBUMIN 4.6 11/04/2022        Lab Results   Component Value Date    HGBA1C 5.6 11/04/2022    HGBA1C 5.6 08/14/2020        Last Cardiology Tests:  ECG:  EKG independently reviewed from today showed sinus rhythm heart rate 86 bpm     Echo:    Ejection Fractions:  Cath:    Stress Test:    Cardiac Imaging:  CT calcium score 2020   LM: 0.  LAD: 109.9. Previously 37.3.  LCx: 72.9. Previously 28.7.  RCA: 40.1. Previously 12.5.     Total: 222.9. Previously 78.4.    Assessment/Plan   Mrs. Taylor is a very pleasant 68 year old female with a history of HLD and GERD, complains of frequent heart palpitations that last all day. She will have a heart monitor to evaluate for an arrhythmia, and an echocardiogram to evaluate the LV function. Continues to stay active working with the dog and horse. Heart rate and blood pressure are well controlled today.     Plan   -call with any questions   -carotid ultrasound   -echocardiogram   -heart monitor for two weeks   -will call to review the results   -continue Propanolol and Crestor     I appreciate the opportunity to participate in the patient's care, please call with any questions         Joyce Templeton, YASSINE-CNP

## 2023-12-11 NOTE — PATIENT INSTRUCTIONS
CALL WITH ANY QUESTIONS   NO MEDICATION CHANGES   CAROTID ULTRASOUND   ECHOCARDIOGRAM   HEART MONITOR FOR TWO WEEKS   WILL CALL TO REVIEW THE RESULTS

## 2023-12-22 ENCOUNTER — APPOINTMENT (OUTPATIENT)
Dept: PRIMARY CARE | Facility: CLINIC | Age: 68
End: 2023-12-22
Payer: MEDICARE

## 2023-12-27 ENCOUNTER — HOSPITAL ENCOUNTER (OUTPATIENT)
Dept: CARDIOLOGY | Facility: HOSPITAL | Age: 68
Discharge: HOME | End: 2023-12-27
Payer: MEDICARE

## 2023-12-27 ENCOUNTER — HOSPITAL ENCOUNTER (OUTPATIENT)
Dept: VASCULAR MEDICINE | Facility: HOSPITAL | Age: 68
Discharge: HOME | End: 2023-12-27
Payer: MEDICARE

## 2023-12-27 DIAGNOSIS — R00.2 HEART PALPITATIONS: ICD-10-CM

## 2023-12-27 DIAGNOSIS — R06.02 SHORTNESS OF BREATH: ICD-10-CM

## 2023-12-27 DIAGNOSIS — R09.89 BRUIT OF LEFT CAROTID ARTERY: ICD-10-CM

## 2023-12-27 PROCEDURE — 93880 EXTRACRANIAL BILAT STUDY: CPT

## 2023-12-27 PROCEDURE — 93306 TTE W/DOPPLER COMPLETE: CPT

## 2023-12-27 PROCEDURE — 93246 EXT ECG>7D<15D RECORDING: CPT

## 2023-12-27 PROCEDURE — 93880 EXTRACRANIAL BILAT STUDY: CPT | Performed by: INTERNAL MEDICINE

## 2023-12-27 PROCEDURE — 2500000004 HC RX 250 GENERAL PHARMACY W/ HCPCS (ALT 636 FOR OP/ED): Performed by: STUDENT IN AN ORGANIZED HEALTH CARE EDUCATION/TRAINING PROGRAM

## 2023-12-27 PROCEDURE — 93306 TTE W/DOPPLER COMPLETE: CPT | Performed by: INTERNAL MEDICINE

## 2023-12-27 RX ADMIN — PERFLUTREN 1 ML OF DILUTION: 6.52 INJECTION, SUSPENSION INTRAVENOUS at 10:45

## 2023-12-30 LAB
AORTIC VALVE MEAN GRADIENT: 3
AORTIC VALVE PEAK VELOCITY: 1.14
AV PEAK GRADIENT: 5.2
AVA (PEAK VEL): 2.72
AVA (VTI): 2.58
EJECTION FRACTION APICAL 4 CHAMBER: 60.5
EJECTION FRACTION: 62
LEFT ATRIUM VOLUME AREA LENGTH INDEX BSA: 16.4
LEFT VENTRICLE INTERNAL DIMENSION DIASTOLE: 4.19 (ref 3.5–6)
LEFT VENTRICULAR OUTFLOW TRACT DIAMETER: 2
MITRAL VALVE E/A RATIO: 0.74
MITRAL VALVE E/E' RATIO: 6.65
RIGHT VENTRICLE FREE WALL PEAK S': 10.3
RIGHT VENTRICLE PEAK SYSTOLIC PRESSURE: 22.3
TRICUSPID ANNULAR PLANE SYSTOLIC EXCURSION: 2.2

## 2024-01-02 ENCOUNTER — TELEPHONE (OUTPATIENT)
Dept: CARDIOLOGY | Facility: HOSPITAL | Age: 69
End: 2024-01-02
Payer: MEDICARE

## 2024-01-02 NOTE — TELEPHONE ENCOUNTER
----- Message from SANDEEP Loredo sent at 1/2/2024  8:04 AM EST -----  Please call and let her know her carotid ultrasound is normal   Thanks   ----- Message -----  From: Elysia Syngo - Cardiology Results In  Sent: 12/27/2023   1:32 PM EST  To: SANDEEP Loredo

## 2024-01-31 DIAGNOSIS — C50.919 MALIGNANT NEOPLASM OF FEMALE BREAST, UNSPECIFIED ESTROGEN RECEPTOR STATUS, UNSPECIFIED LATERALITY, UNSPECIFIED SITE OF BREAST (MULTI): ICD-10-CM

## 2024-01-31 RX ORDER — ANASTROZOLE 1 MG/1
1 TABLET ORAL DAILY
Qty: 90 TABLET | Refills: 3 | Status: SHIPPED | OUTPATIENT
Start: 2024-01-31

## 2024-02-12 ENCOUNTER — OFFICE VISIT (OUTPATIENT)
Dept: OBSTETRICS AND GYNECOLOGY | Facility: CLINIC | Age: 69
End: 2024-02-12
Payer: MEDICARE

## 2024-02-12 VITALS
DIASTOLIC BLOOD PRESSURE: 71 MMHG | HEIGHT: 61 IN | SYSTOLIC BLOOD PRESSURE: 112 MMHG | BODY MASS INDEX: 33.04 KG/M2 | WEIGHT: 175 LBS

## 2024-02-12 DIAGNOSIS — M85.80 OSTEOPENIA, UNSPECIFIED LOCATION: ICD-10-CM

## 2024-02-12 DIAGNOSIS — N90.4 LICHEN SCLEROSUS OF FEMALE GENITALIA: Primary | ICD-10-CM

## 2024-02-12 DIAGNOSIS — Z78.0 MENOPAUSE: ICD-10-CM

## 2024-02-12 PROCEDURE — 99204 OFFICE O/P NEW MOD 45 MIN: CPT | Performed by: OBSTETRICS & GYNECOLOGY

## 2024-02-12 PROCEDURE — 88175 CYTOPATH C/V AUTO FLUID REDO: CPT

## 2024-02-12 PROCEDURE — 1126F AMNT PAIN NOTED NONE PRSNT: CPT | Performed by: OBSTETRICS & GYNECOLOGY

## 2024-02-12 PROCEDURE — 1036F TOBACCO NON-USER: CPT | Performed by: OBSTETRICS & GYNECOLOGY

## 2024-02-12 PROCEDURE — 1159F MED LIST DOCD IN RCRD: CPT | Performed by: OBSTETRICS & GYNECOLOGY

## 2024-02-12 RX ORDER — MAGNESIUM 200 MG
200 TABLET ORAL DAILY
COMMUNITY
End: 2024-06-03 | Stop reason: SINTOL

## 2024-02-12 RX ORDER — CLOBETASOL PROPIONATE 0.5 MG/G
OINTMENT TOPICAL 2 TIMES DAILY
Qty: 15 G | Refills: 2 | Status: SHIPPED | OUTPATIENT
Start: 2024-02-12

## 2024-02-12 NOTE — PROGRESS NOTES
Joe Taylor is a 68 y.o. female who presents with a chief complaint of New Patient Visit (New patient/)  SUBJECTIVE  She is a new patient to this practice.  She has a history of left breast cancer and is monitored by Theresa Monroe and Dr. Matias..  She is current on her breast imaging.    She has no postmenopausal bleeding or pelvic pain.  No dysuria, no change in bowel habits or vaginal discharge.  She is on anastrozole.  A bone density August 29, 2022 showed osteopenia, T-score -2.0.     She has a history of thyroid cancer with thyroidectomy.  She is current on her colonoscopy.  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  She can have vulvar irritation.    Past Medical History:   Diagnosis Date    Asthenopia, bilateral 06/26/2023    Autoimmune thyroiditis     Hashimoto's thyroiditis    Bilateral knee swelling 06/26/2023    Cochlear hydrops, right 06/26/2023    CTS (carpal tunnel syndrome)     Disease of thyroid gland     Disorder of thyroid, unspecified     Thyroid trouble    Epistaxis 06/26/2023    Herpes     Hyperopia with presbyopia 06/26/2023    Hyperthyroidism     Hypothyroidism     Impingement syndrome of left shoulder 06/26/2023    Infection, skin 06/26/2023    Joint pain, knee 06/26/2023    Left breast mass 06/26/2023    Lipoma 06/26/2023    Mild reactive airways disease 06/26/2023    Motion sickness 06/26/2023    Otalgia of both ears 06/26/2023    Other conditions influencing health status     Temporomandibular Joint-pain Dysfunction Syndrome    Other conditions influencing health status     Ulcer    Pain, joint, shoulder 06/26/2023    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    Personal history of other diseases of the digestive system     History of esophageal reflux    Personal history of other diseases of the nervous system and sense organs 10/26/2015    History of hearing loss    Personal history of other diseases of the respiratory system     Personal history of  asthma    Personal history of other endocrine, nutritional and metabolic disease     History of hyperlipidemia    Personal history of other specified conditions 03/13/2020    History of abnormal mammogram    Refractive error 06/26/2023    Rh incompatibility     Scapular dyskinesis 06/26/2023    Tachycardia 06/26/2023     Past Surgical History:   Procedure Laterality Date    APPENDECTOMY  11/22/2013    Appendectomy    BREAST BIOPSY  3/1/2020    BREAST LUMPECTOMY  4/8/2020    OTHER SURGICAL HISTORY  11/22/2013    Ovarian Cystectomy    TOTAL THYROIDECTOMY  03/26/2014    Thyroid Surgery Total Thyroidectomy     Social History     Socioeconomic History    Marital status:      Spouse name: None    Number of children: None    Years of education: None    Highest education level: None   Occupational History    None   Tobacco Use    Smoking status: Former     Packs/day: 0.00     Years: 1.00     Additional pack years: 0.00     Total pack years: 0.00     Types: Cigarettes    Smokeless tobacco: Never    Tobacco comments:     Only smoked in college   Substance and Sexual Activity    Alcohol use: Not Currently    Drug use: Never    Sexual activity: Not Currently     Partners: Male     Birth control/protection: Condom Male, Diaphragm, Post-menopausal, Spermicide, Sponge, None   Other Topics Concern    None   Social History Narrative    None     Social Determinants of Health     Financial Resource Strain: Not on file   Food Insecurity: Not on file   Transportation Needs: Not on file   Physical Activity: Not on file   Stress: Not on file   Social Connections: Not on file   Intimate Partner Violence: Not on file   Housing Stability: Not on file     Family History   Problem Relation Name Age of Onset    Stroke Mother Michelle Reyeswillynorma     Anesthesia related problems Mother Michelle Arnoldbrendaan     Arthritis Mother Michelle Arnoldbrendaan     Aneurysm Father Patricio Bakelseyan     Arthritis Father Patricio Frankiean     Cancer Father  Patricio Taylor     Heart disease Father Patricio Taylor     Hyperlipidemia Father Patricio Taylor     Colon cancer Mother's Brother Nolan Brady        OB History    Para Term  AB Living   1 0           SAB IAB Ectopic Multiple Live Births                  # Outcome Date GA Lbr Kilo/2nd Weight Sex Delivery Anes PTL Lv   1                 OBJECTIVE  Allergies   Allergen Reactions    Sulfa (Sulfonamide Antibiotics) Swelling and Rash    Codeine Dizziness and Other    Mold Unknown    Tree And Shrub Pollen Unknown      (Not in a hospital admission)       Review of Systems  Negative except vulvitis.  Physical Exam  General Appearance: well developed, well nourished  Constitutional: Alert and in no acute distress. Well developed, well nourished.   Head and Face: Head and face: Normal.    Eyes: Normal external exam - nonicteric sclera, extraocular movements intact (EOMI) and no ptosis.   Neck: No neck asymmetry. Supple. Thyroid not enlarged and there were no palpable thyroid nodules.    Pulmonary: No respiratory distress.   Chest: Breasts: Normal appearance, no nipple discharge and no skin changes. Palpation of breasts and axillae: No palpable mass and no axillary lymphadenopathy.   Abdomen: Soft nontender; no abdominal mass palpated. No organomegaly. No hernias.   Genitourinary: External genitalia: External genitalia note hypopigmented changes around the vulva, perineal and perirectal area consider consistent with lichen sclerosis.  No inguinal lymphadenopathy. Bartholin's Urethral and Skenes Glands: Normal. Urethra: Normal.  Bladder: Normal on palpation. Vagina: Normal. Cervix: Normal.  Uterus: Normal.  Right Adnexa/parametria: Normal.  Left Adnexa/parametria: Normal.  Inspection of Perianal Area: Normal.   Musculoskeletal: No joint swelling seen, normal movements of all extremities.   Skin: Normal skin color and pigmentation, normal skin turgor, and no rash.   Neurologic: Non-focal.  "Grossly intact.   Psychiatric: Alert and oriented x 3. Affect normal to patient baseline. Mood: Appropriate.  /71   Ht 1.549 m (5' 1\")   Wt 79.4 kg (175 lb)   BMI 33.07 kg/m²    Problem List Items Addressed This Visit    None   This is a 68-year-old female with lichen sclerosis.  I recommend clobetasol ointment to the area every day for a week, then once to twice weekly thereafter for maintenance.    A Pap smear was sent.  Her routine mammogram is completed with the breast specialist.  She is current on her colonoscopy.    A bone density test is due in August 2024 to monitor the osteopenia.  I do recommend calcium, vitamin D and weightbearing exercise. She is on anastrozole.  I will see her routinely in 1 year.      "

## 2024-02-14 LAB
ATRIAL RATE: 86 BPM
P AXIS: 54 DEGREES
P OFFSET: 206 MS
P ONSET: 158 MS
PR INTERVAL: 122 MS
Q ONSET: 219 MS
QRS COUNT: 14 BEATS
QRS DURATION: 88 MS
QT INTERVAL: 386 MS
QTC CALCULATION(BAZETT): 461 MS
QTC FREDERICIA: 435 MS
R AXIS: 39 DEGREES
T AXIS: 60 DEGREES
T OFFSET: 412 MS
VENTRICULAR RATE: 86 BPM

## 2024-02-23 LAB
CYTOLOGY CMNT CVX/VAG CYTO-IMP: NORMAL
LAB AP HPV GENOTYPE QUESTION: YES
LAB AP HPV HR: NORMAL
LABORATORY COMMENT REPORT: NORMAL
MENSTRUAL HX REPORTED: NORMAL
PATH REPORT.TOTAL CANCER: NORMAL

## 2024-03-08 ENCOUNTER — TELEPHONE (OUTPATIENT)
Dept: PRIMARY CARE | Facility: CLINIC | Age: 69
End: 2024-03-08
Payer: MEDICARE

## 2024-03-11 ENCOUNTER — TELEPHONE (OUTPATIENT)
Dept: SURGICAL ONCOLOGY | Facility: HOSPITAL | Age: 69
End: 2024-03-11

## 2024-03-11 DIAGNOSIS — U07.1 COVID: Primary | ICD-10-CM

## 2024-03-11 RX ORDER — NIRMATRELVIR AND RITONAVIR 300-100 MG
3 KIT ORAL 2 TIMES DAILY
Qty: 30 TABLET | Refills: 0 | Status: SHIPPED | OUTPATIENT
Start: 2024-03-11 | End: 2024-03-16

## 2024-05-20 ENCOUNTER — OFFICE VISIT (OUTPATIENT)
Dept: ORTHOPEDIC SURGERY | Facility: HOSPITAL | Age: 69
End: 2024-05-20
Payer: MEDICARE

## 2024-05-20 ENCOUNTER — HOSPITAL ENCOUNTER (OUTPATIENT)
Dept: RADIOLOGY | Facility: HOSPITAL | Age: 69
Discharge: HOME | End: 2024-05-20
Payer: MEDICARE

## 2024-05-20 VITALS — HEIGHT: 62 IN | WEIGHT: 165 LBS | BODY MASS INDEX: 30.36 KG/M2

## 2024-05-20 DIAGNOSIS — M25.511 RIGHT SHOULDER PAIN, UNSPECIFIED CHRONICITY: ICD-10-CM

## 2024-05-20 DIAGNOSIS — S46.011A TRAUMATIC TEAR OF RIGHT ROTATOR CUFF, UNSPECIFIED TEAR EXTENT, INITIAL ENCOUNTER: Primary | ICD-10-CM

## 2024-05-20 PROCEDURE — 1159F MED LIST DOCD IN RCRD: CPT | Performed by: PHYSICIAN ASSISTANT

## 2024-05-20 PROCEDURE — 73030 X-RAY EXAM OF SHOULDER: CPT | Mod: RT

## 2024-05-20 PROCEDURE — 73030 X-RAY EXAM OF SHOULDER: CPT | Mod: RIGHT SIDE | Performed by: RADIOLOGY

## 2024-05-20 PROCEDURE — 99204 OFFICE O/P NEW MOD 45 MIN: CPT | Performed by: PHYSICIAN ASSISTANT

## 2024-05-20 PROCEDURE — 99214 OFFICE O/P EST MOD 30 MIN: CPT | Performed by: PHYSICIAN ASSISTANT

## 2024-05-20 ASSESSMENT — PAIN - FUNCTIONAL ASSESSMENT: PAIN_FUNCTIONAL_ASSESSMENT: 0-10

## 2024-05-20 ASSESSMENT — PAIN SCALES - GENERAL: PAINLEVEL_OUTOF10: 10 - WORST POSSIBLE PAIN

## 2024-05-20 NOTE — PATIENT INSTRUCTIONS
You were ordered a MRI of the right shoulder.  Please call [phone] to schedule your MRI.  When your MRI is complete, please call the office at [phone] and leave your name and number.  We will call you back with your results    The patient was given a prescription for physical therapy.  Physical therapy is medically necessary to improve strength, balance, range of motion and functional outcomes after injury and/or surgery.    You can take OTC ibuprofen/naproxen with food as needed with food for pain.    Practice good posture, sitting up straight with your shoulder blades back. Avoid over head reaching or heavy lifting.    Follow up pending Mri results

## 2024-05-24 ENCOUNTER — APPOINTMENT (OUTPATIENT)
Dept: SURGICAL ONCOLOGY | Facility: CLINIC | Age: 69
End: 2024-05-24
Payer: MEDICARE

## 2024-05-24 ENCOUNTER — APPOINTMENT (OUTPATIENT)
Dept: RADIOLOGY | Facility: CLINIC | Age: 69
End: 2024-05-24
Payer: MEDICARE

## 2024-05-24 NOTE — PROGRESS NOTES
NPV-   History of Present Illness  69 y.o.female presents at same day walk in clinic for right shoulder pain    1. Traumatic tear of right rotator cuff, unspecified tear extent, initial encounter  MR shoulder right wo IV contrast      2. Right shoulder pain, unspecified chronicity  XR shoulder right 2+ views      Mechanism of injury: fell on shoulder  Date of Injury/Pain: 5/20/24  Location of pain:  anterior and posterior shoulder  Frequency of Pain: worse with certain movements;   Associated symptoms? Swelling.  Previous treatment?  None    27 point review of systems negative except what is stated in HPI    Constitutional Exam: patient's height and weight reviewed, well-kempt  Psychiatric Exam: alert and oriented x 3, appropriate mood and behavior  Eye Exam: HENNA, EOMI  Pulmonary Exam: breathing non-labored, no apparent distress  Lymphatic exam: no appreciable lymphadenopathy in the lower extremities  Cardiovascular exam: DP pulses 2+ bilaterally, PT 2+ bilaterally, toes are pink with good capillary refill, no pitting edema  Skin exam: no open lesions, rashes, abrasions or ulcerations  Neurological exam: sensation to light touch intact in both lower extremities in peripheral and dermatomal distributions (except for any abnormalities noted in musculoskeletal exam)    On examination of the right shoulder, normal spine and shoulder alignment. There is no swelling, erythema, bruising or atrophy. decreased ROM in shoulder flexion, abduction, cross body adduction and external/internal rotation. decreased strength shoulder abduction, adduction, extension external rotation and internal rotation. No tenderness to palpation over the bicipital, subacromial groove and AC joint. No tenderness to palpation of the SC joint, clavicle, greater tuberosity. Neurovascularly intact. Normal sensation to light touch. Radial and ulnar pulses 2+ b/l. No translation or guarding at 45 or 90 degrees. Positive empty can test.  positive drop  arm test. Positive Hawkin's test. Positive Neer's test. Negative Apprehension test. Negative relocation maneuver. Negative Speed's test    Contralateral shoulder: Normal    I personally reviewed the patient's x-ray images and reports of the right shoulder. The xrays show no fractures or dislocation.  Mild AC joint arthritis    ASSESSMENT: right shoulder rotator cuff tear    PLAN: Treatment options were discussed with the patient. I certify medical necessity and need for MRI. Patient was ordered a MRI of the right shoulder for rotator cuff tear. They will call to schedule the MRI and call the office once complete.  The patient was given a prescription for physical therapy.  Physical therapy is medically necessary to improve strength, balance, range of motion and functional outcomes after injury and/or surgery. Patient was given a thrower's ten handout and instructed on an at home stretching program.  They should do these exercises 3 times per day for 6 weeks and then daily. Patient can use OTC Voltaren gel or aspercream for pain.  They will avoid overhead reaching and heavy lifting.  The patient was instructed on practicing good posture.  All the patient's questions were answered. The patient agrees with the above plan.  Follow up pending MRI results

## 2024-06-03 ENCOUNTER — OFFICE VISIT (OUTPATIENT)
Dept: PRIMARY CARE | Facility: CLINIC | Age: 69
End: 2024-06-03
Payer: MEDICARE

## 2024-06-03 VITALS
OXYGEN SATURATION: 97 % | SYSTOLIC BLOOD PRESSURE: 130 MMHG | DIASTOLIC BLOOD PRESSURE: 82 MMHG | BODY MASS INDEX: 31.92 KG/M2 | HEART RATE: 74 BPM | TEMPERATURE: 97.6 F | WEIGHT: 174.5 LBS

## 2024-06-03 DIAGNOSIS — R00.2 PALPITATIONS: ICD-10-CM

## 2024-06-03 DIAGNOSIS — F41.9 ANXIETY: Primary | ICD-10-CM

## 2024-06-03 DIAGNOSIS — M25.511 CHRONIC RIGHT SHOULDER PAIN: ICD-10-CM

## 2024-06-03 DIAGNOSIS — R63.5 WEIGHT GAIN: ICD-10-CM

## 2024-06-03 DIAGNOSIS — G89.29 CHRONIC RIGHT SHOULDER PAIN: ICD-10-CM

## 2024-06-03 PROCEDURE — 1159F MED LIST DOCD IN RCRD: CPT | Performed by: FAMILY MEDICINE

## 2024-06-03 PROCEDURE — 99214 OFFICE O/P EST MOD 30 MIN: CPT | Performed by: FAMILY MEDICINE

## 2024-06-03 PROCEDURE — 1160F RVW MEDS BY RX/DR IN RCRD: CPT | Performed by: FAMILY MEDICINE

## 2024-06-03 RX ORDER — MELOXICAM 7.5 MG/1
7.5 TABLET ORAL 2 TIMES DAILY PRN
Qty: 30 TABLET | Refills: 11 | Status: SHIPPED | OUTPATIENT
Start: 2024-06-03 | End: 2025-06-03

## 2024-06-03 RX ORDER — PROPRANOLOL HYDROCHLORIDE 10 MG/1
10 TABLET ORAL 2 TIMES DAILY PRN
Qty: 60 TABLET | Refills: 11 | Status: SHIPPED | OUTPATIENT
Start: 2024-06-03 | End: 2025-06-03

## 2024-06-03 ASSESSMENT — ENCOUNTER SYMPTOMS
SHORTNESS OF BREATH: 0
WEAKNESS: 0
FATIGUE: 0
VOMITING: 0
COUGH: 0
ARTHRALGIAS: 1
DYSURIA: 0
HEADACHES: 0
ABDOMINAL PAIN: 0
SORE THROAT: 0
TROUBLE SWALLOWING: 0
CONFUSION: 0
HEMATURIA: 0
NERVOUS/ANXIOUS: 1
EYE PAIN: 0
NAUSEA: 0
NUMBNESS: 0
BLOOD IN STOOL: 0
JOINT SWELLING: 0
FEVER: 0
PALPITATIONS: 1
DECREASED CONCENTRATION: 0
UNEXPECTED WEIGHT CHANGE: 0
DIARRHEA: 0
HALLUCINATIONS: 0
FREQUENCY: 0
DIZZINESS: 0

## 2024-06-03 NOTE — PROGRESS NOTES
Subjective   Patient ID: Joe Taylor is a 69 y.o. female.    Patient comes in today to discuss palpitations and anxiety.  She is on propranolol 80 mg extended release and that helps with her anxiety and palpitations she just has a little trouble at night.  She does not want to go up to the next dose.  We discussed adding 10 mg immediate release for nighttime.  Her stress and anxiety has gotten significantly better because her  has surgery for trigeminal neuralgia and now he does not complain as much.  It has really changed her whole outlook on life.  They are going to build a new house.  She had some shoulder issues after she fell, she saw Ortho and they ordered an MRI that she is getting better.  She would like to try physical therapy first.        Review of Systems   Constitutional:  Negative for fatigue, fever and unexpected weight change.   HENT:  Negative for congestion, ear pain, hearing loss, sore throat and trouble swallowing.    Eyes:  Negative for pain and visual disturbance.   Respiratory:  Negative for cough and shortness of breath.    Cardiovascular:  Positive for palpitations. Negative for chest pain and leg swelling.   Gastrointestinal:  Negative for abdominal pain, blood in stool, diarrhea, nausea and vomiting.   Genitourinary:  Negative for dysuria, frequency, hematuria and urgency.   Musculoskeletal:  Positive for arthralgias. Negative for joint swelling.   Skin:  Negative for pallor and rash.   Neurological:  Negative for dizziness, syncope, weakness, numbness and headaches.   Psychiatric/Behavioral:  Negative for confusion, decreased concentration, hallucinations and suicidal ideas. The patient is nervous/anxious.      Vitals:    06/03/24 1054   BP: 130/82   Pulse: 74   Temp: 36.4 °C (97.6 °F)   SpO2: 97%      Objective   Physical Exam  Constitutional:       Appearance: Normal appearance. She is obese.   Cardiovascular:      Rate and Rhythm: Normal rate and regular rhythm.       Heart sounds: Normal heart sounds.   Pulmonary:      Effort: Pulmonary effort is normal.      Breath sounds: Normal breath sounds.   Musculoskeletal:      Cervical back: Neck supple.   Skin:     General: Skin is warm and dry.   Neurological:      General: No focal deficit present.      Mental Status: She is alert.   Psychiatric:         Mood and Affect: Mood normal.         Speech: Speech normal.         Behavior: Behavior normal.         Cognition and Memory: Cognition normal.         Assessment/Plan   Diagnoses and all orders for this visit:  Anxiety  -     propranolol (Inderal) 10 mg tablet; Take 1 tablet (10 mg) by mouth 2 times a day as needed (palpitations).  Palpitations  Chronic right shoulder pain  -     meloxicam (Mobic) 7.5 mg tablet; Take 1 tablet (7.5 mg) by mouth 2 times a day as needed for moderate pain (4 - 6).  Weight gain

## 2024-06-03 NOTE — PATIENT INSTRUCTIONS
It was nice to see you today!  Discussed current concerns and addressed   Reviewed recent labs and diagnostics  Reviewed medications list  Continue to eat a healthy diet, exercise at least 3 times a week or more  Plan and follow up discussed  For any further information related to your condition, copy and paste or go to [email or web address]  Referred her to weight watchers for weight loss.  To physical therapy for her shoulder.  Refilled meloxicam.  Anxiety is doing great but will add 10 mg propranolol for nighttime and for palpitations.  She is going to work on exercise.  Follow-up with cardiology as indicated by them.

## 2024-06-06 ENCOUNTER — OFFICE VISIT (OUTPATIENT)
Dept: ORTHOPEDIC SURGERY | Facility: CLINIC | Age: 69
End: 2024-06-06
Payer: MEDICARE

## 2024-06-06 ENCOUNTER — HOSPITAL ENCOUNTER (OUTPATIENT)
Dept: RADIOLOGY | Facility: CLINIC | Age: 69
Discharge: HOME | End: 2024-06-06
Payer: MEDICARE

## 2024-06-06 VITALS — HEIGHT: 62 IN | WEIGHT: 174 LBS | BODY MASS INDEX: 32.02 KG/M2

## 2024-06-06 DIAGNOSIS — M25.562 ACUTE BILATERAL KNEE PAIN: ICD-10-CM

## 2024-06-06 DIAGNOSIS — M17.0 OSTEOARTHRITIS OF BOTH KNEES, UNSPECIFIED OSTEOARTHRITIS TYPE: ICD-10-CM

## 2024-06-06 DIAGNOSIS — M25.561 ACUTE BILATERAL KNEE PAIN: ICD-10-CM

## 2024-06-06 DIAGNOSIS — M17.0 PRIMARY OSTEOARTHRITIS OF BOTH KNEES: Primary | ICD-10-CM

## 2024-06-06 PROCEDURE — 73562 X-RAY EXAM OF KNEE 3: CPT | Mod: RT

## 2024-06-06 PROCEDURE — 1036F TOBACCO NON-USER: CPT | Performed by: ORTHOPAEDIC SURGERY

## 2024-06-06 PROCEDURE — 99213 OFFICE O/P EST LOW 20 MIN: CPT | Performed by: ORTHOPAEDIC SURGERY

## 2024-06-06 PROCEDURE — 1159F MED LIST DOCD IN RCRD: CPT | Performed by: ORTHOPAEDIC SURGERY

## 2024-06-06 PROCEDURE — 73560 X-RAY EXAM OF KNEE 1 OR 2: CPT | Mod: LT

## 2024-06-06 ASSESSMENT — PAIN - FUNCTIONAL ASSESSMENT: PAIN_FUNCTIONAL_ASSESSMENT: 0-10

## 2024-06-06 ASSESSMENT — PAIN SCALES - GENERAL: PAINLEVEL_OUTOF10: 5 - MODERATE PAIN

## 2024-06-06 NOTE — PROGRESS NOTES
Patient is a 69-year-old female presents today for discussion of bilateral knee osteoarthritis.  She takes meloxicam.  Her left bothers him more than the right.  She does wear a brace on the left side.  She is never had any injections.  She is inquiring about physical therapy.    Bilateral knees:  AAOx3, NAD, walks with a mild antalgic gait  Varus allignment  Range of motion lacks 5 degrees of full extension and flexes to 110 degrees  Stable to varus/valgus/anterior/posterior stress through out the range of motion  Slight laxity with varus stress  Diffuse medial  joint line tenderness to palpation  Moderate effusion  SILT in a carmen/saph/per/tib distribution  5/5 knee extension/df/pf/ehl  ½ dorsalis pedis and posterior tibial pulse  no popliteal lymphadenopathy  no other overlying lesions  mood: euthymic  Respirations non labored    Plain films were reviewed by myself in clinic today.  She does have advanced osteoarthritis of her bilateral knees with complete loss of her medial joint space and tricompartmental osteophytic change.    We discussed treatment including continued anti-inflammatories, injections and physical therapy.  Ultimately she may benefit from total knee replacement.  She was given a prescription for physical therapy.  Will see how she does over time.  All of her questions were answered.    This note was created using voice recognition software and was not corrected for typographical or grammatical errors.

## 2024-06-07 ENCOUNTER — APPOINTMENT (OUTPATIENT)
Dept: RADIOLOGY | Facility: HOSPITAL | Age: 69
End: 2024-06-07
Payer: MEDICARE

## 2024-06-07 ENCOUNTER — APPOINTMENT (OUTPATIENT)
Dept: SURGICAL ONCOLOGY | Facility: CLINIC | Age: 69
End: 2024-06-07
Payer: MEDICARE

## 2024-06-09 DIAGNOSIS — E78.5 HYPERLIPIDEMIA, UNSPECIFIED: ICD-10-CM

## 2024-06-09 DIAGNOSIS — I10 PRIMARY HYPERTENSION: ICD-10-CM

## 2024-06-10 RX ORDER — ROSUVASTATIN CALCIUM 5 MG/1
5 TABLET, COATED ORAL DAILY
Qty: 90 TABLET | Refills: 1 | Status: SHIPPED | OUTPATIENT
Start: 2024-06-10

## 2024-06-10 RX ORDER — PROPRANOLOL HYDROCHLORIDE 80 MG/1
80 CAPSULE, EXTENDED RELEASE ORAL DAILY
Qty: 90 CAPSULE | Refills: 2 | Status: SHIPPED | OUTPATIENT
Start: 2024-06-10

## 2024-06-21 ENCOUNTER — HOSPITAL ENCOUNTER (OUTPATIENT)
Dept: RADIOLOGY | Facility: CLINIC | Age: 69
Discharge: HOME | End: 2024-06-21
Payer: MEDICARE

## 2024-06-21 VITALS — BODY MASS INDEX: 32.01 KG/M2 | HEIGHT: 62 IN | WEIGHT: 173.94 LBS

## 2024-06-21 DIAGNOSIS — Z12.31 ENCOUNTER FOR SCREENING MAMMOGRAM FOR MALIGNANT NEOPLASM OF BREAST: ICD-10-CM

## 2024-06-21 PROCEDURE — 77067 SCR MAMMO BI INCL CAD: CPT

## 2024-06-27 NOTE — PROGRESS NOTES
Joe Taylor female   1955 69 y.o.   [id number]          HPI  Joe Taylor is a 69 y.o.  female [facility] for her left breast cancer.     Breast         AJCC Edition: 8th (AJCC), Diagnosis Date: 13-Mar-2020, IA, pT2 pN0 cM0 G2    Treatment Synopsis:    She has a  left-sided invasive ductal carcinoma, stage IA (T1b N0 M0). The patient's breast cancer was diagnosed on March 13, 2020, and is estrogen receptor positive at >95%, progesterone receptor positive at 90%, and HER-2/chhaya equivocal, her2 not amplified by FISH at 1.7. In addition patient has an oncotype DX recurrence score of 14 translating to a 9-year distant recurrence  rate of 4% with <1% chemotherapy benefit. Details of her history are as follows.      02/21/20220: Bilateral screening mammogram. Showed new spiculated left breast mass.   03/13/2020: Left mammogram with US at 3:00, 9 cm from the nipple, was a 0.5 x 0.6 x 0.7cm mass. Left axilla showed one abnormal looking LN  measuring 2.4 x 0.7 x 1.9cm mass.   03/13/2020: US guided bio[psy of the left breast at 3:00, 9cm from the nipple. Pathology revealed invasive ductal carcinoma with receptors as above. Left axillary LN biopsy did not reveal any malignancy  She had negative genetic testing   04/08/2020: Patient completed a left partial mastectomy with SLNB. Pathology showed a 2.3cm mass, grade 2, with 0/2 SLNs involved.  5/2020  gentic testing via the 34-gene CancerNext panel from [employer].    06/16/2020: Completed radiation   07/01/2020: Started Arimidex switched to Letrozole 12/21 for arthralgias, help 2/2022 and resumes anastrozole May 2022.     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 She was treated with a total thyroidectomy, and no lymph nodes had evidence of metastatic disease. Further treatment was not recommended.      BREAST IMAGIN2024 bilateral screening mammogram, BI-RADS Category 2.  No breast MRI performed.    REPRODUCTIVE HISTORY: menarche age 10, , OCPs x 3 y, menopause age 49, heterogeneously dense breast tissue    FAMILY CANCER HISTORY:   Father: pancreatic cancer age 91  Paternal aunt: breast cancer  Maternal 1st cousin: ovarian cancer 45  Maternal uncle: colon cancer age 65    REVIEW OF SYSTEMS    Constitutional:  Negative for appetite change, fatigue, fever and unexpected weight change.   HENT:  Negative for ear pain, hearing loss, nosebleeds, sore throat and trouble swallowing.    Eyes:  Negative for discharge, itching and visual disturbance.   Respiratory:  Negative for cough, chest tightness and shortness of breath.    Cardiovascular:  Negative for chest pain, palpitations and leg swelling.   Breast: as indicated in HPI  Gastrointestinal:  Negative for abdominal pain, constipation, diarrhea and nausea.   Endocrine: Negative for cold intolerance and heat intolerance.   Genitourinary:  Negative for dysuria, frequency, hematuria, pelvic pain and vaginal bleeding.   Musculoskeletal:  Negative for back pain, gait problem, joint swelling and myalgias, +hip pain  Skin:  Negative for color change and rash.   Allergic/Immunologic: Negative for environmental allergies and food allergies.   Neurological:  Negative for dizziness, tremors, speech difficulty, weakness, numbness and headaches.   Hematological:  Does not bruise/bleed easily.   Psychiatric/Behavioral:  Negative for agitation, dysphoric mood and sleep disturbance. The patient is not nervous/anxious.         MEDICATIONS  Current Outpatient Medications   Medication Instructions    anastrozole (ARIMIDEX) 1 mg, oral, Daily, Swallow whole with a drink of water.    clobetasol (Temovate) 0.05 % ointment Topical, 2 times daily, Apply topically twice weekly sparingly.    coenzyme Q-10 (CO Q-10) 100 mg, oral, Daily    levothyroxine (SYNTHROID, LEVOXYL) 75 mcg,  oral, Daily    liothyronine (CYTOMEL) 15 mcg, oral, Daily    MAGNESIUM GLYCINATE ORAL 500 mg, oral, Daily    meloxicam (MOBIC) 7.5 mg, oral, 2 times daily PRN    propranolol (INDERAL) 10 mg, oral, 2 times daily PRN    propranolol LA (INDERAL LA) 80 mg, oral, Daily    rosuvastatin (CRESTOR) 5 mg, oral, Daily    vitamin B complex (B Complex-Vitamin B12) tablet 1 tablet, oral, Daily    vitamin E 400 Units, oral, Daily    vitamin K2 180 mcg capsule 1 capsule, oral, Daily        ALLERGIES  Allergies   Allergen Reactions    Sulfa (Sulfonamide Antibiotics) Swelling and Rash    Codeine Dizziness and Other    Mold Unknown    Tree And Shrub Pollen Unknown        SOCIAL HISTORY    Family History   Problem Relation Name Age of Onset    Stroke Mother Michelle Taylor     Anesthesia related problems Mother Michelle Taylor     Arthritis Mother Michelle Taylor     Aneurysm Father Patricio Taylor     Arthritis Father Patricio Taylor     Cancer Father Patricio Taylor     Heart disease Father Patricio Taylor     Hyperlipidemia Father Patricio Taylor     Colon cancer Mother's Brother Nolan Brady     Breast cancer Father's Sister           Social History     Tobacco Use    Smoking status: Former     Current packs/day: 0.00     Types: Cigarettes    Smokeless tobacco: Never    Tobacco comments:     Only smoked in college   Substance Use Topics    Alcohol use: Not Currently        VITALS  Vitals:    07/01/24 0804   BP: 138/82   Pulse: 73   Resp: 18   Temp: 36.5 °C (97.7 °F)   SpO2: 97%        PHYSICAL EXAM  Patient is alert and oriented x3, with appropriate mood. The gait is steady and hand grasps are equal. Sclera clear. The left breast with superolateral radial scar lumpectomy incision and left axillary incision. The tissue is soft without palpable abnormalities, discrete nodules or masses. The skin and nipples appear normal, left inverted. There is no cervical, supraclavicular, or axillary lymphadenopathy  palpable. Heart rate and rhythm normal, S1 and S2 appreciated. The lungs are clear bilaterally. Abdomen is soft & non-tender.    Physical Exam  Chest:              IMAGING    Time was spent viewing digital images of the radiology testing with the patient. I explained the results in depth, along with suggested explanation for follow up recommendations based on the testing results.        ORDERS  Orders Placed This Encounter   Procedures    BI mammo bilateral screening tomosynthesis     Standing Status:   Future     Standing Expiration Date:   8/27/2025     Order Specific Question:   Perform a breast ultrasound if clinically indicated by Radiologist?     Answer:   Yes     Order Specific Question:   Previous Mamm performed at  location?     Answer:   Yes     Order Specific Question:   Reason for exam:     Answer:   clinic screen     Order Specific Question:   Radiologist to Determine Optimal Study     Answer:   Yes     Order Specific Question:   Release result to [facility]     Answer:   Immediate     Order Specific Question:   Is this exam part of a Research Study? If Yes, link this order to the research study     Answer:   No         ASSESSMENT/PLAN  1. Healthcare maintenance  BI mammo bilateral screening tomosynthesis      2. Encounter for follow-up surveillance of breast cancer  Clinic Appointment Request      3. Use of anastrozole             Follow up in about 1 year (around 6/22/2025), or with clinic screening mammogram. Continue anastrozole 1mg daily.   Clinical examination and imaging is normal. Please return one year for mammogram and office visit or sooner if you having any problems or concerns.     You can see your health information, review clinical summaries from office visits & test results online when you follow your health with MY  Chart, a personal health record. To sign up go to [email or web address]. If you need assistance with signing up or trouble getting into your account call [facility]  Patient Line 24/7 at [phone].     My office phone number is [phone] if you need to get in touch with me or have additional questions or problems. Thank you for choosing [facility] and trusting me as your healthcare provider. I look forward to seeing you again at your next office visit. I am honored to be a provider on your health care team and I remain dedicated to helping you achieve your health goals.        YASSINE Burns-CNP  [facility]

## 2024-07-01 ENCOUNTER — APPOINTMENT (OUTPATIENT)
Dept: RADIOLOGY | Facility: HOSPITAL | Age: 69
End: 2024-07-01
Payer: MEDICARE

## 2024-07-01 ENCOUNTER — OFFICE VISIT (OUTPATIENT)
Dept: SURGICAL ONCOLOGY | Facility: CLINIC | Age: 69
End: 2024-07-01
Payer: MEDICARE

## 2024-07-01 VITALS
TEMPERATURE: 97.7 F | DIASTOLIC BLOOD PRESSURE: 82 MMHG | SYSTOLIC BLOOD PRESSURE: 138 MMHG | WEIGHT: 172.62 LBS | HEART RATE: 73 BPM | BODY MASS INDEX: 31.56 KG/M2 | OXYGEN SATURATION: 97 % | RESPIRATION RATE: 18 BRPM

## 2024-07-01 DIAGNOSIS — Z85.3 ENCOUNTER FOR FOLLOW-UP SURVEILLANCE OF BREAST CANCER: ICD-10-CM

## 2024-07-01 DIAGNOSIS — Z79.811 USE OF ANASTROZOLE: ICD-10-CM

## 2024-07-01 DIAGNOSIS — Z00.00 HEALTHCARE MAINTENANCE: Primary | ICD-10-CM

## 2024-07-01 DIAGNOSIS — Z08 ENCOUNTER FOR FOLLOW-UP SURVEILLANCE OF BREAST CANCER: ICD-10-CM

## 2024-07-01 PROCEDURE — 99214 OFFICE O/P EST MOD 30 MIN: CPT | Performed by: NURSE PRACTITIONER

## 2024-07-01 PROCEDURE — 1159F MED LIST DOCD IN RCRD: CPT | Performed by: NURSE PRACTITIONER

## 2024-07-01 PROCEDURE — 1160F RVW MEDS BY RX/DR IN RCRD: CPT | Performed by: NURSE PRACTITIONER

## 2024-07-01 PROCEDURE — 1126F AMNT PAIN NOTED NONE PRSNT: CPT | Performed by: NURSE PRACTITIONER

## 2024-07-01 RX ORDER — EPINEPHRINE 0.22MG
100 AEROSOL WITH ADAPTER (ML) INHALATION DAILY
COMMUNITY

## 2024-07-01 RX ORDER — VITAMIN E MIXED 400 UNIT
400 CAPSULE ORAL DAILY
COMMUNITY

## 2024-07-01 ASSESSMENT — PAIN SCALES - GENERAL: PAINLEVEL: 0-NO PAIN

## 2024-08-14 ENCOUNTER — APPOINTMENT (OUTPATIENT)
Dept: RADIOLOGY | Facility: HOSPITAL | Age: 69
End: 2024-08-14
Payer: MEDICARE

## 2024-08-14 PROBLEM — H04.123 DRY EYES: Status: ACTIVE | Noted: 2023-06-26

## 2024-08-14 PROBLEM — D12.6 ADENOMATOUS POLYP OF COLON: Status: ACTIVE | Noted: 2018-03-27

## 2024-08-14 PROBLEM — M25.562 CHRONIC PAIN OF BOTH KNEES: Status: ACTIVE | Noted: 2018-04-23

## 2024-08-14 PROBLEM — E89.0 POSTOPERATIVE HYPOTHYROIDISM: Status: ACTIVE | Noted: 2020-08-06

## 2024-08-14 PROBLEM — E66.9 OBESITY: Status: ACTIVE | Noted: 2024-08-14

## 2024-08-14 PROBLEM — M25.561 CHRONIC PAIN OF BOTH KNEES: Status: ACTIVE | Noted: 2018-04-23

## 2024-08-14 PROBLEM — E78.2 MIXED DYSLIPIDEMIA: Status: ACTIVE | Noted: 2020-08-06

## 2024-08-14 PROBLEM — R05.3 CHRONIC COUGH: Status: ACTIVE | Noted: 2024-08-14

## 2024-08-14 PROBLEM — C73 PAPILLARY CARCINOMA OF THYROID (MULTI): Status: ACTIVE | Noted: 2023-06-26

## 2024-08-14 PROBLEM — M75.40 IMPINGEMENT SYNDROME OF SHOULDER REGION: Status: ACTIVE | Noted: 2024-08-14

## 2024-08-14 PROBLEM — S76.309A HAMSTRING INJURY: Status: ACTIVE | Noted: 2024-08-14

## 2024-08-14 PROBLEM — A69.20 LYME DISEASE: Status: ACTIVE | Noted: 2023-06-26

## 2024-08-14 PROBLEM — R07.81 RIB PAIN: Status: ACTIVE | Noted: 2023-06-26

## 2024-08-14 PROBLEM — K02.9 DENTAL CARIES: Status: ACTIVE | Noted: 2023-06-26

## 2024-08-14 PROBLEM — J45.909 REACTIVE AIRWAY DISEASE (HHS-HCC): Status: ACTIVE | Noted: 2024-08-14

## 2024-08-14 PROBLEM — Z96.1 PSEUDOPHAKIA OF BOTH EYES: Status: ACTIVE | Noted: 2023-10-24

## 2024-08-14 PROBLEM — N63.0 MASS OF BREAST: Status: ACTIVE | Noted: 2023-06-26

## 2024-08-14 PROBLEM — H31.103 HEREDITARY DRUSEN, BILATERAL: Status: ACTIVE | Noted: 2023-10-24

## 2024-08-14 PROBLEM — C50.919 MALIGNANT NEOPLASM OF BREAST (MULTI): Status: ACTIVE | Noted: 2023-06-26

## 2024-08-14 PROBLEM — M25.519 ARTHRALGIA OF SHOULDER: Status: ACTIVE | Noted: 2024-08-14

## 2024-08-14 PROBLEM — Z86.39 HISTORY OF ELEVATED LIPIDS: Status: ACTIVE | Noted: 2024-08-14

## 2024-08-14 PROBLEM — H26.493 PCO (POSTERIOR CAPSULAR OPACIFICATION), BILATERAL: Status: ACTIVE | Noted: 2023-11-07

## 2024-08-14 PROBLEM — Z85.850 HISTORY OF THYROID CANCER: Status: ACTIVE | Noted: 2020-08-06

## 2024-08-14 PROBLEM — D17.9 LIPOMA: Status: ACTIVE | Noted: 2023-06-26

## 2024-08-14 PROBLEM — Z85.3 HISTORY OF CANCER OF LEFT BREAST: Status: ACTIVE | Noted: 2020-08-06

## 2024-08-14 PROBLEM — J30.2 SEASONAL ALLERGIC RHINITIS: Status: ACTIVE | Noted: 2022-06-23

## 2024-08-14 PROBLEM — G89.29 CHRONIC PAIN OF BOTH KNEES: Status: ACTIVE | Noted: 2018-04-23

## 2024-08-14 PROBLEM — J31.0 CHRONIC RHINITIS: Status: ACTIVE | Noted: 2022-05-31

## 2024-08-14 PROBLEM — R43.0 LOSS OF SENSE OF SMELL: Status: ACTIVE | Noted: 2022-05-31

## 2024-08-14 PROBLEM — S39.92XA INJURY OF LOW BACK: Status: ACTIVE | Noted: 2023-06-26

## 2024-08-14 PROBLEM — M17.0 PRIMARY OSTEOARTHRITIS OF BOTH KNEES: Status: ACTIVE | Noted: 2018-10-26

## 2024-08-14 PROBLEM — H52.00 HYPEROPIA: Status: ACTIVE | Noted: 2024-08-14

## 2024-08-14 PROBLEM — M25.469 SWELLING OF KNEE: Status: ACTIVE | Noted: 2024-08-14

## 2024-08-14 PROBLEM — M19.90 LOCALIZED OSTEOARTHROSIS: Status: ACTIVE | Noted: 2023-06-26

## 2024-08-14 PROBLEM — R42 DIZZINESS: Status: ACTIVE | Noted: 2024-08-14

## 2024-08-14 PROBLEM — R94.120 ABNORMAL OTO-ACOUSTIC EMISSION TEST: Status: ACTIVE | Noted: 2024-08-14

## 2024-08-14 PROBLEM — H93.13 SUBJECTIVE TINNITUS OF BOTH EARS: Status: ACTIVE | Noted: 2024-08-14

## 2024-08-14 PROBLEM — H04.123 DRY EYE SYNDROME OF BOTH EYES: Status: ACTIVE | Noted: 2023-10-24

## 2024-08-14 PROBLEM — J32.2 CHRONIC ETHMOIDAL SINUSITIS: Status: ACTIVE | Noted: 2022-06-23

## 2024-08-14 PROBLEM — S40.019A CONTUSION OF SHOULDER: Status: ACTIVE | Noted: 2024-08-14

## 2024-08-14 PROBLEM — M79.676 PAIN OF TOE: Status: ACTIVE | Noted: 2024-08-14

## 2024-08-14 PROBLEM — L08.9 INFECTION OF SKIN: Status: ACTIVE | Noted: 2023-06-26

## 2024-08-14 PROBLEM — H35.371 EPIRETINAL MEMBRANE (ERM) OF RIGHT EYE: Status: ACTIVE | Noted: 2023-10-24

## 2024-08-14 PROBLEM — M75.20 BICEPS TENDINITIS: Status: ACTIVE | Noted: 2024-08-14

## 2024-08-14 PROBLEM — T75.3XXA MOTION SICKNESS: Status: ACTIVE | Noted: 2023-06-26

## 2024-08-14 PROBLEM — E66.9 OBESITY WITH BODY MASS INDEX 30 OR GREATER: Status: ACTIVE | Noted: 2024-08-14

## 2024-08-14 PROBLEM — R04.0 EPISTAXIS: Status: ACTIVE | Noted: 2023-06-26

## 2024-08-14 PROBLEM — M54.16 LUMBAR RADICULOPATHY: Status: ACTIVE | Noted: 2023-06-26

## 2024-08-14 PROBLEM — M25.569 ARTHRALGIA OF KNEE: Status: ACTIVE | Noted: 2024-08-14

## 2024-08-14 PROBLEM — H92.03 OTALGIA OF BOTH EARS: Status: ACTIVE | Noted: 2023-06-26

## 2024-08-14 PROBLEM — M76.60 ACHILLES TENDINITIS: Status: ACTIVE | Noted: 2024-08-14

## 2024-08-14 PROBLEM — E07.9 DISORDER OF THYROID: Status: ACTIVE | Noted: 2023-06-26

## 2024-08-14 PROBLEM — N76.2 ACUTE VULVITIS: Status: ACTIVE | Noted: 2024-08-14

## 2024-08-14 PROBLEM — M25.449 SWELLING OF FINGER JOINT: Status: ACTIVE | Noted: 2024-08-14

## 2024-08-15 ENCOUNTER — OFFICE VISIT (OUTPATIENT)
Dept: PRIMARY CARE | Facility: CLINIC | Age: 69
End: 2024-08-15
Payer: MEDICARE

## 2024-08-15 VITALS
HEART RATE: 77 BPM | SYSTOLIC BLOOD PRESSURE: 124 MMHG | TEMPERATURE: 97.6 F | OXYGEN SATURATION: 96 % | WEIGHT: 171 LBS | RESPIRATION RATE: 16 BRPM | DIASTOLIC BLOOD PRESSURE: 68 MMHG | BODY MASS INDEX: 31.27 KG/M2

## 2024-08-15 DIAGNOSIS — C50.919 MALIGNANT NEOPLASM OF FEMALE BREAST, UNSPECIFIED ESTROGEN RECEPTOR STATUS, UNSPECIFIED LATERALITY, UNSPECIFIED SITE OF BREAST (MULTI): ICD-10-CM

## 2024-08-15 DIAGNOSIS — B85.1 PEDICULOSIS CORPORIS: Primary | ICD-10-CM

## 2024-08-15 PROCEDURE — 1159F MED LIST DOCD IN RCRD: CPT | Performed by: FAMILY MEDICINE

## 2024-08-15 PROCEDURE — 99213 OFFICE O/P EST LOW 20 MIN: CPT | Performed by: FAMILY MEDICINE

## 2024-08-15 RX ORDER — PERMETHRIN 50 MG/G
CREAM TOPICAL ONCE
Qty: 60 G | Refills: 6 | Status: SHIPPED | OUTPATIENT
Start: 2024-08-15 | End: 2024-08-15

## 2024-08-19 ASSESSMENT — ENCOUNTER SYMPTOMS
ABDOMINAL PAIN: 0
HEADACHES: 0
FREQUENCY: 0
COUGH: 0
NAUSEA: 0
JOINT SWELLING: 0
FATIGUE: 0
WEAKNESS: 0
HALLUCINATIONS: 0
DECREASED CONCENTRATION: 0
TROUBLE SWALLOWING: 0
DYSURIA: 0
EYE PAIN: 0
UNEXPECTED WEIGHT CHANGE: 0
SHORTNESS OF BREATH: 0
VOMITING: 0
DIARRHEA: 0
BLOOD IN STOOL: 0
NUMBNESS: 0
PALPITATIONS: 0
HEMATURIA: 0
CONFUSION: 0
FEVER: 0
SORE THROAT: 0
DIZZINESS: 0

## 2024-08-19 NOTE — PATIENT INSTRUCTIONS
Suspicious for pediculosis of some sort.  Will give permethrin 5% cream to put on at night and wash off in the morning.  Repeat as necessary.

## 2024-08-19 NOTE — PROGRESS NOTES
Subjective   Patient ID: Joe Taylor is a 69 y.o. female.    Patient has been exposed to something called bird mites.  She has had it in the past.  The mites come from the nests in her barn and get in between the hay.  She would like medication.  She is being followed for her history of breast cancer and that has been going well.        Review of Systems   Constitutional:  Negative for fatigue, fever and unexpected weight change.   HENT:  Negative for congestion, ear pain, hearing loss, sore throat and trouble swallowing.    Eyes:  Negative for pain and visual disturbance.   Respiratory:  Negative for cough and shortness of breath.    Cardiovascular:  Negative for chest pain, palpitations and leg swelling.   Gastrointestinal:  Negative for abdominal pain, blood in stool, diarrhea, nausea and vomiting.   Genitourinary:  Negative for dysuria, frequency, hematuria and urgency.   Musculoskeletal:  Negative for joint swelling.   Skin:  Positive for rash. Negative for pallor.   Neurological:  Negative for dizziness, syncope, weakness, numbness and headaches.   Psychiatric/Behavioral:  Negative for confusion, decreased concentration, hallucinations and suicidal ideas.      Vitals:    08/15/24 0903   BP: 124/68   Pulse: 77   Resp: 16   Temp: 36.4 °C (97.6 °F)   SpO2: 96%      Objective   Physical Exam  Constitutional:       Appearance: Normal appearance.   Skin:     Comments: She has papules diffusely on her body sparing the face   Neurological:      Mental Status: She is alert.         Assessment/Plan   Diagnoses and all orders for this visit:  Pediculosis corporis  -     permethrin (Elimite) 5 % cream; Apply topically 1 time for 1 dose. Apply to skin from hairline to toes and wash off 8-10 hours later.  Malignant neoplasm of female breast, unspecified estrogen receptor status, unspecified laterality, unspecified site of breast (Multi)

## 2024-08-29 ENCOUNTER — APPOINTMENT (OUTPATIENT)
Dept: RADIOLOGY | Facility: HOSPITAL | Age: 69
End: 2024-08-29
Payer: MEDICARE

## 2024-09-05 ENCOUNTER — HOSPITAL ENCOUNTER (OUTPATIENT)
Dept: RADIOLOGY | Facility: HOSPITAL | Age: 69
Discharge: HOME | End: 2024-09-05
Payer: MEDICARE

## 2024-09-05 DIAGNOSIS — Z78.0 MENOPAUSE: ICD-10-CM

## 2024-09-05 PROCEDURE — 77080 DXA BONE DENSITY AXIAL: CPT

## 2024-09-05 ASSESSMENT — LIFESTYLE VARIABLES
3_OR_MORE_DRINKS_PER_DAY: N
CURRENT_SMOKER: N

## 2024-09-06 NOTE — RESULT ENCOUNTER NOTE
The bone density test shows osteopenia of the hip and spine.  There is a 7.5% drop in the spine since the last study, the hip is stable (no significant drop).    I recommend dietary calcium, vitamin D supplement and weightbearing exercise.  The next bone density test is due in 2 years

## 2024-09-08 ASSESSMENT — ENCOUNTER SYMPTOMS
BOWEL INCONTINENCE: 0
HEADACHES: 0
WEAKNESS: 1
PERIANAL NUMBNESS: 0
PARESTHESIAS: 0
LEG PAIN: 1
PARESIS: 0
BACK PAIN: 1
ABDOMINAL PAIN: 0
NUMBNESS: 1
FEVER: 0
TINGLING: 1
DYSURIA: 0
WEIGHT LOSS: 0

## 2024-09-09 ENCOUNTER — APPOINTMENT (OUTPATIENT)
Dept: PRIMARY CARE | Facility: CLINIC | Age: 69
End: 2024-09-09
Payer: MEDICARE

## 2024-09-09 VITALS
TEMPERATURE: 97.5 F | OXYGEN SATURATION: 96 % | BODY MASS INDEX: 32 KG/M2 | SYSTOLIC BLOOD PRESSURE: 120 MMHG | HEART RATE: 93 BPM | WEIGHT: 175 LBS | DIASTOLIC BLOOD PRESSURE: 70 MMHG

## 2024-09-09 DIAGNOSIS — M62.838 MUSCLE SPASM: Primary | ICD-10-CM

## 2024-09-09 PROCEDURE — 99213 OFFICE O/P EST LOW 20 MIN: CPT | Performed by: FAMILY MEDICINE

## 2024-09-09 PROCEDURE — 1125F AMNT PAIN NOTED PAIN PRSNT: CPT | Performed by: FAMILY MEDICINE

## 2024-09-09 RX ORDER — PERMETHRIN 50 MG/G
CREAM TOPICAL
COMMUNITY
Start: 2024-08-15

## 2024-09-09 RX ORDER — SAME BUTANEDISULFONATE/BETAINE 400-600 MG
POWDER IN PACKET (EA) ORAL
COMMUNITY

## 2024-09-09 RX ORDER — TIZANIDINE 4 MG/1
4 TABLET ORAL EVERY 6 HOURS PRN
Qty: 40 TABLET | Refills: 0 | Status: SHIPPED | OUTPATIENT
Start: 2024-09-09 | End: 2024-10-09

## 2024-09-09 ASSESSMENT — PAIN SCALES - GENERAL: PAINLEVEL: 5

## 2024-09-10 ASSESSMENT — ENCOUNTER SYMPTOMS
NUMBNESS: 1
TINGLING: 1
WEIGHT LOSS: 0
SHORTNESS OF BREATH: 0
DIZZINESS: 0
UNEXPECTED WEIGHT CHANGE: 0
PERIANAL NUMBNESS: 0
DIARRHEA: 0
PARESTHESIAS: 0
ABDOMINAL PAIN: 0
EYE PAIN: 0
HEADACHES: 0
FREQUENCY: 0
TROUBLE SWALLOWING: 0
FATIGUE: 0
DYSURIA: 0
BOWEL INCONTINENCE: 0
BLOOD IN STOOL: 0
DECREASED CONCENTRATION: 0
CONFUSION: 0
FEVER: 0
VOMITING: 0
BACK PAIN: 1
WEAKNESS: 1
PALPITATIONS: 0
JOINT SWELLING: 0
COUGH: 0
HEMATURIA: 0
SORE THROAT: 0
PARESIS: 0
NAUSEA: 0
LEG PAIN: 1
HALLUCINATIONS: 0

## 2024-09-10 NOTE — PATIENT INSTRUCTIONS
She has multiple issues going on.  I have believe she has some sort of radicular issue on the right lower back giving her symptoms down the leg.  I think she has an isolated heel issue that is being helped by orthotics.  She may have a greater trochanteric bursitis with some IT band involvement.  She is already going to physical therapy for her shoulder so I will send her for her lower extremity issues.  She is on meloxicam and I will add tizanidine.  Follow-up if symptoms not better.

## 2024-09-12 NOTE — RESULT ENCOUNTER NOTE
It appears the patient did not see her message in [facility], she has osteopenia on the bone density test.  I recommend repeat bone density test in 2 years.

## 2024-09-24 ENCOUNTER — APPOINTMENT (OUTPATIENT)
Dept: PRIMARY CARE | Facility: CLINIC | Age: 69
End: 2024-09-24
Payer: MEDICARE

## 2024-09-24 VITALS
BODY MASS INDEX: 31.63 KG/M2 | SYSTOLIC BLOOD PRESSURE: 120 MMHG | TEMPERATURE: 93.7 F | OXYGEN SATURATION: 97 % | HEART RATE: 77 BPM | WEIGHT: 173 LBS | DIASTOLIC BLOOD PRESSURE: 70 MMHG

## 2024-09-24 DIAGNOSIS — M54.16 LUMBAR RADICULOPATHY: Primary | ICD-10-CM

## 2024-09-24 PROCEDURE — 1126F AMNT PAIN NOTED NONE PRSNT: CPT | Performed by: FAMILY MEDICINE

## 2024-09-24 PROCEDURE — 99213 OFFICE O/P EST LOW 20 MIN: CPT | Performed by: FAMILY MEDICINE

## 2024-09-24 RX ORDER — METHYLPREDNISOLONE 4 MG/1
TABLET ORAL
Qty: 21 TABLET | Refills: 0 | Status: SHIPPED | OUTPATIENT
Start: 2024-09-24 | End: 2024-10-01

## 2024-09-24 RX ORDER — MOLNUPIRAVIR 200 MG/1
CAPSULE ORAL
COMMUNITY
Start: 2024-03-09

## 2024-09-24 ASSESSMENT — ENCOUNTER SYMPTOMS
SHORTNESS OF BREATH: 0
DECREASED CONCENTRATION: 0
FEVER: 0
JOINT SWELLING: 0
UNEXPECTED WEIGHT CHANGE: 0
NAUSEA: 0
VOMITING: 0
TROUBLE SWALLOWING: 0
BACK PAIN: 1
COUGH: 0
DIARRHEA: 0
PALPITATIONS: 0
BLOOD IN STOOL: 0
EYE PAIN: 0
HALLUCINATIONS: 0
ABDOMINAL PAIN: 0
FATIGUE: 0
SORE THROAT: 0
DIZZINESS: 0
HEMATURIA: 0
WEAKNESS: 0
FREQUENCY: 0
NUMBNESS: 1
DYSURIA: 0
CONFUSION: 0
HEADACHES: 0

## 2024-09-24 ASSESSMENT — PAIN SCALES - GENERAL: PAINLEVEL: 0-NO PAIN

## 2024-09-24 NOTE — PROGRESS NOTES
Subjective   Patient ID: Joe Taylor is a 69 y.o. female.    Patient comes in today because her low back pain is worse.  She had come in last week and was sent to physical therapy.  It was not severe.  It is now radiating down her back and into her right foot.  She is finding it difficult to walk.  There is no loss of bowel or bladder.  She did not have x-rays.  She has been taking meloxicam and a muscle relaxant.  She is in physical therapy.  It is getting worse.        Review of Systems   Constitutional:  Negative for fatigue, fever and unexpected weight change.   HENT:  Negative for congestion, ear pain, hearing loss, sore throat and trouble swallowing.    Eyes:  Negative for pain and visual disturbance.   Respiratory:  Negative for cough and shortness of breath.    Cardiovascular:  Negative for chest pain, palpitations and leg swelling.   Gastrointestinal:  Negative for abdominal pain, blood in stool, diarrhea, nausea and vomiting.   Genitourinary:  Negative for dysuria, frequency, hematuria and urgency.   Musculoskeletal:  Positive for back pain. Negative for joint swelling.   Skin:  Negative for pallor and rash.   Neurological:  Positive for numbness. Negative for dizziness, syncope, weakness and headaches.   Psychiatric/Behavioral:  Negative for confusion, decreased concentration, hallucinations and suicidal ideas.      Vitals:    09/24/24 1240   BP: 120/70   Pulse: 77   Temp: 34.3 °C (93.7 °F)   SpO2: 97%      Objective   Physical Exam  Constitutional:       Appearance: She is obese.      Comments: Appears to be in pain   Musculoskeletal:      Comments: Limited bending over. No deformity. Weakness RLE, may be due to pain. DTR nl   Neurological:      Mental Status: She is alert.   Psychiatric:         Mood and Affect: Mood normal.         Thought Content: Thought content normal.         Judgment: Judgment normal.         Assessment/Plan   Diagnoses and all orders for this visit:  Lumbar  radiculopathy  -     Referral to Pain Medicine; Future  -     MR lumbar spine wo IV contrast; Future

## 2024-09-24 NOTE — PATIENT INSTRUCTIONS
Sx worse  Will try for MRI and refer to pain management  Declines pain meds  Cont meloxicam, tizanidine  Precautions given

## 2024-10-09 ENCOUNTER — HOSPITAL ENCOUNTER (OUTPATIENT)
Dept: RADIOLOGY | Facility: HOSPITAL | Age: 69
Discharge: HOME | End: 2024-10-09
Payer: MEDICARE

## 2024-10-09 DIAGNOSIS — M54.16 LUMBAR RADICULOPATHY: ICD-10-CM

## 2024-10-09 PROCEDURE — 72148 MRI LUMBAR SPINE W/O DYE: CPT

## 2024-10-09 PROCEDURE — 72148 MRI LUMBAR SPINE W/O DYE: CPT | Performed by: RADIOLOGY

## 2024-10-21 ENCOUNTER — OFFICE VISIT (OUTPATIENT)
Dept: PHYSICAL MEDICINE AND REHAB | Facility: CLINIC | Age: 69
End: 2024-10-21
Payer: MEDICARE

## 2024-10-21 VITALS
SYSTOLIC BLOOD PRESSURE: 147 MMHG | HEART RATE: 84 BPM | DIASTOLIC BLOOD PRESSURE: 90 MMHG | TEMPERATURE: 98.2 F | RESPIRATION RATE: 18 BRPM

## 2024-10-21 DIAGNOSIS — M53.3 SACROILIAC JOINT PAIN: Primary | ICD-10-CM

## 2024-10-21 PROCEDURE — 99214 OFFICE O/P EST MOD 30 MIN: CPT | Performed by: PHYSICAL MEDICINE & REHABILITATION

## 2024-10-21 PROCEDURE — 1159F MED LIST DOCD IN RCRD: CPT | Performed by: PHYSICAL MEDICINE & REHABILITATION

## 2024-10-21 PROCEDURE — 99204 OFFICE O/P NEW MOD 45 MIN: CPT | Performed by: PHYSICAL MEDICINE & REHABILITATION

## 2024-10-21 PROCEDURE — G2211 COMPLEX E/M VISIT ADD ON: HCPCS | Performed by: PHYSICAL MEDICINE & REHABILITATION

## 2024-10-21 PROCEDURE — 1160F RVW MEDS BY RX/DR IN RCRD: CPT | Performed by: PHYSICAL MEDICINE & REHABILITATION

## 2024-10-21 PROCEDURE — 1125F AMNT PAIN NOTED PAIN PRSNT: CPT | Performed by: PHYSICAL MEDICINE & REHABILITATION

## 2024-10-21 PROCEDURE — 1036F TOBACCO NON-USER: CPT | Performed by: PHYSICAL MEDICINE & REHABILITATION

## 2024-10-21 ASSESSMENT — PAIN SCALES - GENERAL: PAINLEVEL_OUTOF10: 2

## 2024-10-21 NOTE — PROGRESS NOTES
Physical Medicine and Rehabilitation MSK Consult  10/21/24       Chief Complaint:  Low back pain     HPI:  Joe Taylor is a  69 y.o. F who presents to the clinic today  for evaluation of low back pain.  Onset: started in August 2024;   No particular etiology  Location: R lower back  Radiation: intermittent down the R leg, lateral thigh come and goes and lower lateral leg  Quality: sharp  Feels  it goes out of align  Duration: comes and goes  Aggravating factors:  prolonged sitting especially on toilet, pain w sit to stand and feels a click and gets into malalignment  Alleviating factors: stretches and exercises  Severity: 10  Numbness/Tingling: Yes - burns in the of the R foot hamstring and calf stretches burning for months  Bowel or bladder incontinence: No  Pt's current medication regimen includes: medrol pack w mild relief, mobic alternates w  ibuprofen     Treatment to date:  Physical therapy: Yes   Medications taken to date for this complaint include the following:   Mild relief w zanaflex  Prior injections: L4 in 1980s        Of note has a ho of breast cancer L, lumpectomy and radiation,. No chemotherapy but on arimidex.    Sij does help.        Imaging  Reviewed 10/21/24 w patient and personally  Mri L spine 10/2024  MR LUMBAR SPINE WO IV CONTRAST;  10/9/2024 11:07 am      INDICATION:  Signs/Symptoms:weakness RLE, pain in back.      ,M54.16 Radiculopathy, lumbar region      COMPARISON:  None.      ACCESSION NUMBER(S):  [id number]      ORDERING CLINICIAN:  KITTY SIMPSON      TECHNIQUE:  Sagittal T1, T2, STIR, axial T1 and T2 weighted images of the lumbar  spine were acquired.      FINDINGS:  This report assumes 5 non rib-bearing lumbar vertebral bodies. The  lowest intervertebral disc will be labeled L5-S1.      Alignment: There is straightening of the normal lumbar lordosis.  Coronal  images demonstrate a slight dextrocurvature of the  lumbar spine. There is a 4 mm L5-S1 anterolisthesis.       Vertebrae/Intervertebral Discs:      The vertebral bodies demonstrate expected height. No evidence of an  acute fracture. There is a 1.3 cm T1 hypointense, T2 and STIR  hyperintense lesion within the anterior L1 vertebral body (series 2,  image 13) compatible with mildly atypical hemangioma. Scattered oval  T1 and T2 hyperintense lesions throughout the lumbar spine are most  compatible with intraosseous hemangiomas. Multilevel disc desiccation  and disc height loss with endplate degenerative changes and  osteophytic spurring. There is moderate to severe L2-L3 and L3-L4  disc height loss. Mild STIR hyperintense signal along the L2-L3  endplate/vertebral bodies most compatible with reactive  hypervascularity or osseous edema.      Conus medullaris: The lower thoracic cord appears unremarkable. The  conus medullaris terminates at L1.      T11-T12: Sagittal images demonstrate no significant spinal canal or  neural foraminal stenosis.      T12-L1: Small disc bulge without significant spinal canal or neural  foraminal stenosis.      L1-2: Small disc bulge contributing to mild spinal canal stenosis.  There is effacement of the right subarticular recess. Mild right and  no significant left neural foraminal stenosis.      L2-3: Disc bulge with osteophyte spurring with a superimposed left  central/paracentral disc extrusion with superior migration along the  posterior L2 vertebral body. Prominent dorsal epidural fat and mild  ligamentum flavum hypertrophy. There is severe spinal canal stenosis.  There is effacement of the bilateral subarticular recess. Moderate  left and mild right neural foraminal stenosis.      L3-4: Disc bulge with osteophyte spurring, prominent dorsal epidural  fat and ligamentum flavum hypertrophy contributing to moderate spinal  canal stenosis. There is effacement of the bilateral subarticular  recess. There is moderate bilateral neural foraminal stenosis,  left-greater-than-right.      L4-5: Disc  bulge with a superimposed central disc herniation. Facet  osteoarthrosis, ligamentum flavum hypertrophy and prominent dorsal  epidural fat. There is moderate spinal canal stenosis. There is  effacement of the bilateral subarticular recess. There is severe  right and mild left neural foraminal stenosis.      L5-S1:  Anterolisthesis with uncovering of the intervertebral disc  with a superimposed disc bulge there is prominent epidural fat  contributing to mild spinal canal stenosis. Mild bilateral neural  foraminal stenosis. Trace right facet joint effusion with mild  periarticular edema.      The prevertebral and paraspinal musculature are unremarkable.      IMPRESSION:  1. Multilevel degenerative changes as above resulting in neural  foraminal stenosis most pronounced at left L2-L3, bilateral L3-L4,  right L4-L5 and spinal canal stenosis most pronounced at L2-L3, L3-L4  and L4-L5. Multilevel subarticular recess effacement as described  above.  2. Trace right L5-S1 facet joint effusion with mild periarticular  edema may be reactive in etiology. An underlying infection can not be  excluded on the basis of this examination. Correlation with patient's  symptomology and laboratory values is recommended.  3. A 1.3 cm T1 isointense and T2 and STIR hyperintense lesion within  the anterior L1 vertebral body, compatible with mildly atypical  hemangioma.          Past Medical History:   Diagnosis Date    Asthenopia, bilateral 06/26/2023    Autoimmune thyroiditis     Hashimoto's thyroiditis    Bilateral knee swelling 06/26/2023    Cochlear hydrops, right 06/26/2023    CTS (carpal tunnel syndrome)     Disease of thyroid gland     Disorder of thyroid, unspecified     Thyroid trouble    Epistaxis 06/26/2023    Herpes     Hyperopia with presbyopia 06/26/2023    Hyperthyroidism     Hypothyroidism     Impingement syndrome of left shoulder 06/26/2023    Infection, skin 06/26/2023    Joint pain, knee 06/26/2023    Left breast mass  06/26/2023    Lipoma 06/26/2023    Mild reactive airways disease (HHS-HCC) 06/26/2023    Motion sickness 06/26/2023    Otalgia of both ears 06/26/2023    Other conditions influencing health status     Temporomandibular Joint-pain Dysfunction Syndrome    Other conditions influencing health status     Ulcer    Pain, joint, shoulder 06/26/2023    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    Personal history of other diseases of the digestive system     History of esophageal reflux    Personal history of other diseases of the nervous system and sense organs 10/26/2015    History of hearing loss    Personal history of other diseases of the respiratory system     Personal history of asthma    Personal history of other endocrine, nutritional and metabolic disease     History of hyperlipidemia    Personal history of other specified conditions 03/13/2020    History of abnormal mammogram    Refractive error 06/26/2023    Rh incompatibility     Scapular dyskinesis 06/26/2023    Tachycardia 06/26/2023        Past Surgical History:   Procedure Laterality Date    APPENDECTOMY  11/22/2013    Appendectomy    BREAST BIOPSY  3/1/2020    BREAST LUMPECTOMY  4/8/2020    OTHER SURGICAL HISTORY  11/22/2013    Ovarian Cystectomy    TOTAL THYROIDECTOMY  03/26/2014    Thyroid Surgery Total Thyroidectomy        Patient Active Problem List    Diagnosis Date Noted    Abnormal josé miguel-acoustic emission test 08/14/2024    Acute vulvitis 08/14/2024    History of elevated lipids 08/14/2024    Hamstring injury 08/14/2024    Dizziness 08/14/2024    Contusion of shoulder 08/14/2024    Chronic cough 08/14/2024    Biceps tendinitis 08/14/2024    Achilles tendinitis 08/14/2024    Impingement syndrome of shoulder region 08/14/2024    Obesity with body mass index 30 or greater 08/14/2024    Obesity 08/14/2024    Pain of toe 08/14/2024    Subjective tinnitus of both ears 08/14/2024    Swelling of finger joint 08/14/2024    Arthralgia of knee  08/14/2024    Arthralgia of shoulder 08/14/2024    Swelling of knee 08/14/2024    Hyperopia 08/14/2024    Reactive airway disease (HHS-HCC) 08/14/2024    Use of anastrozole 07/01/2024    Chronic right shoulder pain 06/03/2024    Weight gain 06/03/2024    Healthcare maintenance 12/05/2023    PCO (posterior capsular opacification), bilateral 11/07/2023    Hereditary drusen, bilateral 10/24/2023    Epiretinal membrane (ERM) of right eye 10/24/2023    Dry eye syndrome of both eyes 10/24/2023    Pseudophakia of both eyes 10/24/2023    Anxiety 06/26/2023    Asthma 06/26/2023    Dry eyes 06/26/2023    Malignant neoplasm of breast 06/26/2023    Localized osteoarthrosis 06/26/2023    Dental caries 06/26/2023    Hyperglycemia 06/26/2023    Epistaxis 06/26/2023    Fatigue 06/26/2023    Ganglion of joint 06/26/2023    Hashimoto's thyroiditis 06/26/2023    Hyperlipidemia 06/26/2023    Disorder of thyroid 06/26/2023    Infection of skin 06/26/2023    Mass of breast 06/26/2023    Lipoma 06/26/2023    Injury of low back 06/26/2023    Lyme disease 06/26/2023    Meniere's disease 06/26/2023    Motion sickness 06/26/2023    Otalgia of both ears 06/26/2023    Palpitations 06/26/2023    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    Rib pain 06/26/2023    Lumbar radiculopathy 06/26/2023    Follicular adenoma of thyroid gland 06/26/2023    Chronic ethmoidal sinusitis 06/23/2022    Seasonal allergic rhinitis 06/23/2022    Chronic rhinitis 05/31/2022    Loss of sense of smell 05/31/2022    History of cancer of left breast 08/06/2020    History of thyroid cancer 08/06/2020    Mixed dyslipidemia 08/06/2020    Postoperative hypothyroidism 08/06/2020    Primary osteoarthritis of both knees 10/26/2018    Chronic pain of both knees 04/23/2018    Adenomatous polyp of colon 03/27/2018   Breast htn   Thyroid cancer  Hlp  Palpitations, dysautonomia     Family History   Problem Relation Name Age of Onset    Stroke Mother Michelle Taylor      Anesthesia related problems Mother Michelle Taylor     Arthritis Mother Michelle Taylor     Aneurysm Father Patricio Taylor     Arthritis Father Patricio Taylor     Cancer Father Patricio Taylor     Heart disease Father Patricio Taylor     Hyperlipidemia Father Patricio Taylor     Colon cancer Mother's Brother Nolan Brady     Breast cancer Father's Sister          Current Outpatient Medications   Medication Sig Dispense Refill    anastrozole (Arimidex) 1 mg tablet Take 1 tablet (1 mg total) by mouth once daily.  Swallow whole with a drink of water. 90 tablet 3    cholecalciferol, vitD3,/vit K2 (vitamin D3-vitamin K2) 125-90 mcg capsule Take by mouth.      clobetasol (Temovate) 0.05 % ointment Apply topically 2 times a day. Apply topically twice weekly sparingly. 15 g 2    coenzyme Q-10 (Co Q-10) 100 mg capsule Take 1 capsule (100 mg) by mouth once daily.      levothyroxine (Synthroid, Levoxyl) 75 mcg tablet Take 1 tablet (75 mcg) by mouth once daily.      liothyronine (Cytomel) 5 mcg tablet Take 3 tablets (15 mcg) by mouth once daily.      MAGNESIUM GLYCINATE ORAL Take 500 mg by mouth once daily.      meloxicam (Mobic) 7.5 mg tablet Take 1 tablet (7.5 mg) by mouth 2 times a day as needed for moderate pain (4 - 6). 30 tablet 11    propranolol (Inderal) 10 mg tablet Take 1 tablet (10 mg) by mouth 2 times a day as needed (palpitations). 60 tablet 11    propranolol LA (Inderal LA) 80 mg 24 hr capsule TAKE 1 CAPSULE BY MOUTH ONCE DAILY 90 capsule 2    rosuvastatin (Crestor) 5 mg tablet TAKE 1 TABLET BY MOUTH ONCE DAILY 90 tablet 1    vitamin B complex (B Complex-Vitamin B12) tablet Take 1 tablet by mouth once daily.      vitamin E 180 mg (400 unit) capsule Take 1 capsule (400 Units) by mouth once daily.      vitamin K2 180 mcg capsule Take 1 capsule by mouth once daily.      Lagevrio, EUA, capsule capsule TAKE 4 CAPSULE TWICE A DAY FOR 5 DAYS      permethrin (Elimite) 5 % cream Apply topically 1  time for 1 dose. Apply to skin from hairline to toes and wash off 8-10 hours later.      tiZANidine (Zanaflex) 4 mg tablet Take 1 tablet (4 mg) by mouth every 6 hours if needed for muscle spasms. 40 tablet 0     No current facility-administered medications for this visit.        Allergies   Allergen Reactions    Sulfa (Sulfonamide Antibiotics) Swelling and Rash    Codeine Dizziness and Other    Mold Unknown    Tree And Shrub Pollen Unknown        Social History     Socioeconomic History    Marital status:    Tobacco Use    Smoking status: Former     Current packs/day: 0.00     Types: Cigarettes    Smokeless tobacco: Never    Tobacco comments:     Only smoked in [employer]   Vaping Use    Vaping status: Never Used   Substance and Sexual Activity    Alcohol use: Not Currently     Comment: has had a in 50 years    Drug use: Never    Sexual activity: Not Currently     Partners: Male     Birth control/protection: Condom Male, Diaphragm, Post-menopausal, Spermicide, Sponge, None   Lives w   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   Eyes:  Denies change in visual acuity   HENT:  Denies nasal congestion or sore throat   Respiratory:  Denies cough or shortness of breath   Cardiovascular:  Denies chest pain or edema   GI:  Denies abdominal pain, nausea, vomiting, bloody stools or diarrhea   :  Denies dysuria   Integument:  Denies rash   Neurologic:  As per HPI  MSK: Per above HPI  Endocrine:  Denies polyuria or polydipsia   Lymphatic:  Denies swollen glands   Psychiatric:  Denies depression or anxiety            PHYSICAL EXAM:  /90 (BP Location: Right arm, Patient Position: Sitting)   Pulse 84   Temp 36.8 °C (98.2 °F)   Resp 18     General:  NAD, well developed, [unfilled]      Psychiatric: appropriate mood & affect.   Cardiovascular:  Normal pedal pulses; no LE edema.  Respiratory:  Normal  rate; unlabored breathing.  Skin:  Intact; no erythema; no ecchymosis or rash.  Lymphatic:  No lymphadenopathy or lymphedema.  NEURO:  Alert and appropriate. Speech fluent, conversing appropriately.   Motor:    Rt: HF 5/5, KE 5/5, KF 5/5, DF 5/5, EHL 5/5, PF 5/5.    Lt: HF 5/5, KE 5/5, KF 5/5, DF 5/5, EHL 5/5, PF 5/5.  Sensation:     Light touch: intact in the b/l L3-S1 dermatomes.    PP: intact in the b/l L3-S1 dermatomes  Reflexes:     Right:  patellar 2+, achilles 2+,     Left: patellar 2+, achilles 2+,     Babinski's downgoing b/l; no clonus  Gait: Normal, narrow based gait.     MSK:  Inspection reveals no evidence of a pelvic obliqity   Spinal range of motion: Flexion to 70° degrees, Extension of 10 degrees.  There is tenderness over the R psis.   Special tests:    Straight leg raise: - bl    Slump test: -bl    Facet loading: -bl  Stork test _ gaelns negative bl  Assymetry in bl psis     Dominic and farid - bl  Restriction due to adductor tightness bl     Impression: Joe Taylor is a 69 y.o. F w pmh of hlp, breast cancer sp lumpectomy and radiation on arimidex, thyroid cancer sp thyroidectomy sp      Plan:  Orders Placed This Encounter   Procedures    XR sacroiliac joints 3+ views       Xr sij  Reviewed MRI results. Does have severe spinal stenosis L2/3, no neuro deficits, reviewed red flags of cord compression  Started PT. Continue w core strengthening rom, continue SIJ belt for symptomatic relief  Mobic prn, ok to take 2 on bad day  Fu 6 weeks, wants to hold off injections for now     Fu 6 weeks  Thank you for the consultation.     Daysi Drake MD  Physical Medicine and Rehabilitation

## 2024-10-28 ENCOUNTER — APPOINTMENT (OUTPATIENT)
Dept: PHYSICAL MEDICINE AND REHAB | Facility: CLINIC | Age: 69
End: 2024-10-28
Payer: MEDICARE

## 2024-11-04 ENCOUNTER — HOSPITAL ENCOUNTER (OUTPATIENT)
Dept: RADIOLOGY | Facility: HOSPITAL | Age: 69
Discharge: HOME | End: 2024-11-04
Payer: MEDICARE

## 2024-11-04 DIAGNOSIS — M53.3 SACROILIAC JOINT PAIN: ICD-10-CM

## 2024-11-04 PROCEDURE — 72202 X-RAY EXAM SI JOINTS 3/> VWS: CPT

## 2024-11-04 PROCEDURE — 72202 X-RAY EXAM SI JOINTS 3/> VWS: CPT | Performed by: RADIOLOGY

## 2024-11-20 ENCOUNTER — OFFICE VISIT (OUTPATIENT)
Dept: HEMATOLOGY/ONCOLOGY | Facility: CLINIC | Age: 69
End: 2024-11-20
Payer: MEDICARE

## 2024-11-20 ENCOUNTER — TELEPHONE (OUTPATIENT)
Dept: HEMATOLOGY/ONCOLOGY | Facility: CLINIC | Age: 69
End: 2024-11-20

## 2024-11-20 VITALS
SYSTOLIC BLOOD PRESSURE: 126 MMHG | BODY MASS INDEX: 31.71 KG/M2 | HEART RATE: 81 BPM | DIASTOLIC BLOOD PRESSURE: 81 MMHG | WEIGHT: 173.39 LBS | TEMPERATURE: 94.3 F

## 2024-11-20 DIAGNOSIS — M85.88 OSTEOPENIA OF LUMBAR SPINE: ICD-10-CM

## 2024-11-20 DIAGNOSIS — C50.919 MALIGNANT NEOPLASM OF FEMALE BREAST, UNSPECIFIED ESTROGEN RECEPTOR STATUS, UNSPECIFIED LATERALITY, UNSPECIFIED SITE OF BREAST: ICD-10-CM

## 2024-11-20 DIAGNOSIS — Z85.3 HISTORY OF RIGHT BREAST CANCER: ICD-10-CM

## 2024-11-20 DIAGNOSIS — Z79.811 ENCOUNTER FOR MONITORING AROMATASE INHIBITOR THERAPY: ICD-10-CM

## 2024-11-20 DIAGNOSIS — C50.412 MALIGNANT NEOPLASM OF UPPER-OUTER QUADRANT OF LEFT BREAST IN FEMALE, ESTROGEN RECEPTOR POSITIVE: Primary | ICD-10-CM

## 2024-11-20 DIAGNOSIS — Z51.81 ENCOUNTER FOR MONITORING AROMATASE INHIBITOR THERAPY: ICD-10-CM

## 2024-11-20 DIAGNOSIS — Z92.3 HISTORY OF EXTERNAL BEAM RADIATION THERAPY: ICD-10-CM

## 2024-11-20 DIAGNOSIS — Z17.0 MALIGNANT NEOPLASM OF UPPER-OUTER QUADRANT OF LEFT BREAST IN FEMALE, ESTROGEN RECEPTOR POSITIVE: Primary | ICD-10-CM

## 2024-11-20 PROCEDURE — 1160F RVW MEDS BY RX/DR IN RCRD: CPT | Performed by: NURSE PRACTITIONER

## 2024-11-20 PROCEDURE — 99215 OFFICE O/P EST HI 40 MIN: CPT | Performed by: NURSE PRACTITIONER

## 2024-11-20 PROCEDURE — 1159F MED LIST DOCD IN RCRD: CPT | Performed by: NURSE PRACTITIONER

## 2024-11-20 PROCEDURE — 1125F AMNT PAIN NOTED PAIN PRSNT: CPT | Performed by: NURSE PRACTITIONER

## 2024-11-20 RX ORDER — ANASTROZOLE 1 MG/1
1 TABLET ORAL DAILY
Qty: 90 TABLET | Refills: 3 | Status: SHIPPED | OUTPATIENT
Start: 2024-11-20 | End: 2024-11-20 | Stop reason: SDUPTHER

## 2024-11-20 RX ORDER — ANASTROZOLE 1 MG/1
1 TABLET ORAL DAILY
Qty: 90 TABLET | Refills: 3 | Status: SHIPPED | OUTPATIENT
Start: 2024-11-20

## 2024-11-20 ASSESSMENT — PAIN SCALES - GENERAL: PAINLEVEL_OUTOF10: 4

## 2024-11-20 NOTE — TELEPHONE ENCOUNTER
Left message on voicemail that the pharmacy she instructed me to send in for refills comes up as out of network. I asked her to please verify which pharmacy to send her refills of anastrozole too and either message me through [employer] or call our office and talk to refill nurses who will relay the message. Silvia

## 2024-11-20 NOTE — PATIENT INSTRUCTIONS
1. Exercise 2.5 hours per week; bone strengthening, cardio-vascular, resistance training.  2. Please do self breast exams monthly.  3. Keep alcohol under 3 drinks per week.  4. Sun safety - limit sun exposure from 11a-2p when its at its hottest, apply 15-30 sun block and re-apply every 1-2 hours if perspiring or swimming.  5. Eat a plant based diet, add in oily fishes such as mackerel, tuna, and salmon.  6. Get in at least 1,000 mg of calcium per day through diet or supplement for bone strength. Examples of foods higher in calcium are milk, yogurt, fruited yogurt, oranges, fortified orange juice, almonds, almond milk, broccoli, spinach, bok courtney, mustard greens, puddings, custards, ice cream, fortified cereals, bars, and crackers.   7. Continue anastrozole 1mg daily.  8. Please call the office if any new mass or rash in or around breast, or any uncontrolled symptoms that last over 2-3 weeks at [phone].  9. I will place a referral to our residents rheumatology clinic for their opinion on initiated treatment for the bone loss in your spine.  10. It was nice seeing you today, Joe. I will see you back in one year. Your mammogram is due in late June. Please schedule that if you haven't.  Thank you for choosing [facility] for your care.  Have a Happy, Healthy, Holiday Season!

## 2024-11-20 NOTE — PROGRESS NOTES
"Oncology Follow-Up    Joe Taylor  [id number]         Breast         AJCC Edition: 8th (AJCC), Diagnosis Date: 13-Mar-2020, IA, pT2 pN0 cM0 G2      Treatment Synopsis:    65-year-old postmenopausal  female with left-sided invasive ductal carcinoma, stage IA (T1b N0 M0). The patient's breast cancer was diagnosed on March 13, 2020, and is estrogen receptor positive at >95%, progesterone receptor positive at 90%, and HER-2/chhaya equivocal, her2 not amplified by FISH at 1.7. In addition patient has an oncotype DX recurrence score of 14 translating to a 9-year distant recurrence  rate of 4% with <1% chemotherapy benefit. Details of her history are as follows.      02/21/20220: Bilateral screening mammogram. Showed new spiculated left breast mass.   03/13/2020: Left mammogram with US at 3:00, 9 cm from the nipple, was a 0.5 x 0.6 x 0.7cm mass. Left axilla showed one abnormal looking LN  measuring 2.4 x 0.7 x 1.9cm mass.   03/13/2020: US guided bio[psy of the left breast at 3:00, 9cm from the nipple. Pathology revealed invasive ductal carcinoma with receptors as above. Left axillary LN biopsy did not reveal any malignancy   04/08/2020: Patient completed a left partial mastectomy with SLNB. Pathology showed a 2.3cm mass, grade 2, with 0/2 SLNs involved.   06/16/2020: Completed radiation   07/01/2020: Started Arimidex       Subjective    Joe presents for her Oncology Follow Up Visit. She has many health complaints though not related to her breast cancer. She does have an upcoming appointment with her primary care provider in early January. She states she researches breast cancer all the time. Joe continues on anastrozole with good tolerance. She asks for a refill. She had her bone density through her gynecologist Dr. Wise. Joe rates her energy level as 7-8/10, she walks for exercise. Joe rates her energy level as 5/10 due to \"life\". Joe and her  are thinking about building a " new house on their property for half-way. They will then tear down their old home. Joe denies any unusual headaches, balance issues, depression, cough, shortness of breath, problems swallowing, changes in chest/breast area, abdominal pain, bone or muscle pain, vaginal bleeding, rectal bleeding, blood in the urine, vaginal dryness, swelling arms or legs, new or unusual skin moles or lesions. Joe asks about having Breast Cancer Index testing done as she is coming close to 5 years of treatment.    Objective      Vitals:    11/20/24 0907   BP: 126/81   Pulse: 81   Temp: 34.6 °C (94.3 °F)        Constitutional: Well developed, alert/oriented x3, no distress, cooperative   Eyes: clear sclera   ENMT: mucous membranes moist, no apparent lesions   Head/Neck: Neck supple, no bruits   Respiratory/Thorax: Patent airways, normal breath sounds with good chest expansion   Cardiovascular: Regular rate and rhythm, no murmurs, 2+ equal pulses of the extremities,   Gastrointestinal: Nondistended, soft, non-tender, no masses palpable, no organomegaly   Musculoskeletal: ROM intact, no joint swelling, normal strength   Extremities: normal extremities, no edema, cyanosis, contusions or wounds   Neurological: alert and oriented x3,  normal strength   Breast:     Lymphatic: No significant lymphadenopathy   Psychological: Appropriate mood and behavior   Skin: Warm and dry, no lesions, no rashes      Physical Exam  Chest:          Comments: Left breast + for breast conserving surgery with well healed lateral and axillary incisions; no masses, nodules, skin changes, discharge. Right breast without masses, nodules, skin changes, discharge. Scattered dense tissue bilaterally.          Lab Results   Component Value Date    WBC 4.3 (L) 12/06/2023    HGB 13.6 12/06/2023    HCT 43.0 12/06/2023    MCV 86 12/06/2023     12/06/2023       Chemistry    Lab Results   Component Value Date/Time     11/04/2022 1023    K 4.4 11/04/2022  1023     11/04/2022 1023    CO2 28 11/04/2022 1023    BUN 24 (H) 11/04/2022 1023    CREATININE 0.71 11/04/2022 1023    Lab Results   Component Value Date/Time    CALCIUM 9.9 11/04/2022 1023    ALKPHOS 109 11/04/2022 1023    AST 23 11/04/2022 1023    ALT 29 11/04/2022 1023    BILITOT 1.0 11/04/2022 1023         Imaging:    Status Exam Begun Exam Ended   Final 6/21/2024 09:16 6/21/2024 09:44     Study Result    Narrative & Impression   Interpreted By:  Anand Carrasquillo,   STUDY:  BI MAMMO BILATERAL SCREENING TOMOSYNTHESIS;  6/21/2024 9:44 am      ACCESSION NUMBER(S):  [id number]      ORDERING CLINICIAN:  DRU MARTINEZ      INDICATION:  Screening. Left lumpectomy with radiation. Best possible images.      COMPARISON:  05/19/2023 and 05/04/2022      FINDINGS:  2D and tomosynthesis images were reviewed at 1 mm slice thickness.      Density:  The breast tissue is heterogeneously dense, which may  obscure small masses.      Stable postsurgical scarring with associated surgical clips in the  superolateral left breast at posterior depth. No suspicious masses or  calcifications are identified bilaterally.      IMPRESSION:  No mammographic evidence of malignancy.      BI-RADS CATEGORY:      BI-RADS Category:  2 Benign.  Recommendation:  Annual Screening.  Recommended Date:  1 Year.  Laterality:  Bilateral.       9/05/2024 17:43 [phone]     Exam Information    Status Exam Begun Exam Ended   Final 9/05/2024 09:32 9/05/2024 09:47     Study Result    Narrative & Impression   Interpreted By:  Sylvia Lewis,   STUDY:  DEXA BONE DENSITY9/5/2024 9:47 am      INDICATION:  Signs/Symptoms:menopause, osteopenia, on anastrozole. The patient is  a 68 y/o  year old F.      COMPARISON:  08/29/2022      ACCESSION NUMBER(S):  [id number]      ORDERING CLINICIAN:  ERICA CASTAÑEDA      TECHNIQUE:  DEXA BONE DENSITY      FINDINGS:  SPINE L1-L4  Bone Mineral Density: 0.889  T-Score -1.4  Z-Score 0.7  Classification:   Osteopenia  Bone Mineral Density change vs baseline:  -15.3  Bone Mineral Density change vs previous: -7.5      LEFT FEMUR -TOTAL  Bone Mineral Density: 0.808  T-Score -1.1   Z-Score  0.4  Classification:  Osteopenia  Bone Mineral Density change vs baseline: -6.0  Bone Mineral Density change vs previous: 0.6      LEFT FEMUR -NECK  Bone Mineral Density: 0.616  T-Score -2.1  Z-Score -0.3  Classification:  Osteopenia          World Health Organization (WHO) criteria for post-menopausal,   Women:  Normal:         T-score at or above -1 SD  Osteopenia:   T-score between -1 and -2.5 SD  Osteoporosis: T-score at or below -2.5 SD          10-year Fracture Risk:  Major Osteoporotic Fracture  11  Hip Fracture                        2.2      Note:  If no FRAX score is reported, it is because:  Some T-score for Spine Total or Hip Total or Femoral Neck at or below  -2.5      This exam was performed at [facility] on a [employer] Dexa Unit.      IMPRESSION:  DEXA:  According to World Health Organization criteria,  classification is low bone mass (osteopenia)     Assessment/Plan    Joe is a 70 yo woman with a hx of T2N0 left IDC G-2 diagnosed March 2020. She is s/p partial mastectomy, XRT, and is currently on anastrozole with good tolerance. There is no evidence of recurrent disease on today's exam.     Plan:  Exam is negative.  Continue anastrozole 1mg daily. After discussion, we will send for Breast Cancer Index testing.  Advised Joe to talk with Dr. Mack about her concerns over her lab work as it is not related to anastrozole or her breast cancer.  Will refer to the rheumatology resident's clinic to discuss possible treatment as she does have a 7.5% drop in her AP spine since her last bone density.   Discussed that she may be causing undue stress by doing research about breast cancer continually. Advised to use appropriate sites such a Breastcancer.org, [place], and [place] for the cure.  She may want to limit her time doing this as well.   Encouraged monthly breast self exams, plant based diet, keep alcohol <3 drinks/week, exercise at least 2.5 hours/week.  We reviewed signs/symptoms of recurrence including new masses, new pigmented lesion, tugging or pulling of the skin, nipple discharge, rash in or around the chest area, or any new finding that doesn't resolve within a 2-3 weeks.  All of Joe's questions/concerns were addressed.  Over 25 minutes of time was spent with this patient with >50% of the time with education, counseling, and coordination of care.   Joe will see Robyn Lamb NP in July with her mammogram. I will see her back in one year. She will call with any concerns.     Diagnoses and all orders for this visit:  Malignant neoplasm of upper-outer quadrant of left breast in female, estrogen receptor positive  -     Clinic Appointment Request Follow Up; [facility]; Future  -     Referral to Rheumatology; Future  Encounter for monitoring aromatase inhibitor therapy  -     Clinic Appointment Request Follow Up; [facility]; Future  -     Referral to Rheumatology; Future  History of external beam radiation therapy  History of right breast cancer  -     Clinic Appointment Request  Osteopenia of lumbar spine  -     Clinic Appointment Request Follow Up; [facility]; Future  -     Referral to Rheumatology; Future        YASSINE Underwood-CNP

## 2024-11-21 ENCOUNTER — APPOINTMENT (OUTPATIENT)
Dept: ORTHOPEDIC SURGERY | Facility: CLINIC | Age: 69
End: 2024-11-21
Payer: MEDICARE

## 2024-11-25 ENCOUNTER — OFFICE VISIT (OUTPATIENT)
Dept: CARDIOLOGY | Facility: HOSPITAL | Age: 69
End: 2024-11-25
Payer: MEDICARE

## 2024-11-25 VITALS
HEART RATE: 85 BPM | BODY MASS INDEX: 31.93 KG/M2 | OXYGEN SATURATION: 96 % | WEIGHT: 174.6 LBS | DIASTOLIC BLOOD PRESSURE: 79 MMHG | SYSTOLIC BLOOD PRESSURE: 121 MMHG

## 2024-11-25 DIAGNOSIS — R00.2 HEART PALPITATIONS: Primary | ICD-10-CM

## 2024-11-25 PROCEDURE — 1159F MED LIST DOCD IN RCRD: CPT | Performed by: NURSE PRACTITIONER

## 2024-11-25 PROCEDURE — 99214 OFFICE O/P EST MOD 30 MIN: CPT | Performed by: NURSE PRACTITIONER

## 2024-11-25 PROCEDURE — 1036F TOBACCO NON-USER: CPT | Performed by: NURSE PRACTITIONER

## 2024-11-25 PROCEDURE — 99417 PROLNG OP E/M EACH 15 MIN: CPT | Performed by: NURSE PRACTITIONER

## 2024-11-25 ASSESSMENT — ENCOUNTER SYMPTOMS
CONSTITUTIONAL NEGATIVE: 1
PSYCHIATRIC NEGATIVE: 1
EYES NEGATIVE: 1
RESPIRATORY NEGATIVE: 1
HEMATOLOGIC/LYMPHATIC NEGATIVE: 1
ENDOCRINE NEGATIVE: 1
GASTROINTESTINAL NEGATIVE: 1
CARDIOVASCULAR NEGATIVE: 1
NEUROLOGICAL NEGATIVE: 1

## 2024-11-25 NOTE — PROGRESS NOTES
"Referred by Dr. Key ref. provider found for Follow-up (Patient states she saw her PCP in September for concerns of heart palpitations and heart \"pounding\".  She states her PCP advised she come see cardiology and that she has been adjusting her thyroid medication.) and Palpitations     History Of Present Illness:    Mrs. Taylor is a very pleasant 69 year old female with a history of breast cancer, HLD and heart palpitations, she is here for a follow up visit. The patient is seen in collaboration with Dr. Hilario. Mrs. Taylor feels heart pounding when laying on left side. Extra Propanolol it does not help. Trying different medication levels for his thyroid. Denies chest pain or shortness of breath.  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       Review of Systems   Constitutional: Negative.   HENT: Negative.     Eyes: Negative.    Cardiovascular: Negative.    Respiratory: Negative.     Endocrine: Negative.    Hematologic/Lymphatic: Negative.    Skin: Negative.    Gastrointestinal: Negative.    Neurological: Negative.    Psychiatric/Behavioral: Negative.          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), CTS (carpal tunnel syndrome), Disease of thyroid gland, Disorder of thyroid, unspecified, Epistaxis (06/26/2023), Herpes, Hyperopia with presbyopia (06/26/2023), Hyperthyroidism, Hypothyroidism, Impingement syndrome of left shoulder (06/26/2023), Infection, skin (06/26/2023), Joint pain, knee (06/26/2023), Left breast mass (06/26/2023), Lipoma (06/26/2023), Lumbosacral disc disease, Mild reactive airways disease (HHS-HCC) (06/26/2023), Motion sickness (06/26/2023), Otalgia of both ears (06/26/2023), Other conditions influencing health status, Other conditions influencing health status, Pain, joint, shoulder (06/26/2023), " 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    Past Surgical History:  She has a past surgical history that includes Appendectomy (11/22/2013); Other surgical history (11/22/2013); Total thyroidectomy (03/26/2014); Breast biopsy (3/1/2020); and Breast lumpectomy (4/8/2020).      Social History:  She reports that she has quit smoking. Her smoking use included cigarettes. She has never used smokeless tobacco. She reports that she does not currently use alcohol. She reports that she does not use drugs.    Family History:  Family History   Problem Relation Name Age of Onset    Stroke Mother Michelle Taylor     Anesthesia related problems Mother Michelle Reyeszian     Arthritis Mother Michelle Taylor     Anesthesia problems Mother Michelle Taylor     Aneurysm Father Patricio Baivyouzian     Arthritis Father Patricio Bajaksouzian     Cancer Father Patricio Bajaksouzian     Heart disease Father Patricio Bajaksouzian     Hyperlipidemia Father Patricio Reyeszian     Colon cancer Mother's Brother Nolan Coronadosanjuana     Breast cancer Father's Sister          Allergies:  Sulfa (sulfonamide antibiotics), Codeine, Mold, and Tree and shrub pollen    Outpatient Medications:  Current Outpatient Medications   Medication Instructions    anastrozole (ARIMIDEX) 1 mg, oral, Daily, Swallow whole with a drink of water.    anastrozole (ARIMIDEX) 1 mg, oral, Daily, Swallow whole with a drink of water.    cholecalciferol, vitD3,/vit K2  (vitamin D3-vitamin K2) 125-90 mcg capsule Take by mouth.    clobetasol (Temovate) 0.05 % ointment Topical, 2 times daily, Apply topically twice weekly sparingly.    coenzyme Q-10 (CO Q-10) 100 mg, Daily    levothyroxine (SYNTHROID, LEVOXYL) 75 mcg, Daily    liothyronine (CYTOMEL) 15 mcg, Daily    MAGNESIUM GLYCINATE ORAL 500 mg, Daily    meloxicam (MOBIC) 7.5 mg, oral, 2 times daily PRN    propranolol (INDERAL) 10 mg, oral, 2 times daily PRN    propranolol LA (INDERAL LA) 80 mg, oral, Daily    rosuvastatin (CRESTOR) 5 mg, oral, Daily    tiZANidine (ZANAFLEX) 4 mg, oral, Every 6 hours PRN    vitamin B complex (B Complex-Vitamin B12) tablet 1 tablet, Daily    vitamin E 400 Units, Daily    vitamin K2 180 mcg capsule 1 capsule, Daily        Last Recorded Vitals:  Vitals:    11/25/24 1347   BP: 121/79   Pulse: 85   SpO2: 96%   Weight: 79.2 kg (174 lb 9.7 oz)         Physical Exam:  Physical Exam  Vitals reviewed.   HENT:      Head: Normocephalic.      Nose: Nose normal.   Eyes:      Pupils: Pupils are equal, round, and reactive to light.   Cardiovascular:      Rate and Rhythm: Normal rate and regular rhythm.   Pulmonary:      Effort: Pulmonary effort is normal.      Breath sounds: Normal breath sounds.   Abdominal:      General: Abdomen is flat.      Palpations: Abdomen is soft.   Musculoskeletal:         General: Normal range of motion.      Cervical back: Normal range of motion.   Skin:     General: Skin is warm and dry.   Neurological:      General: No focal deficit present.      Mental Status: He is alert and oriented to person, place, and time.   Psychiatric:         Mood and Affect: Mood normal.            Last Labs:  CBC -  Lab Results   Component Value Date    WBC 4.3 (L) 12/06/2023    HGB 13.6 12/06/2023    HCT 43.0 12/06/2023    MCV 86 12/06/2023     12/06/2023       CMP -  Lab Results   Component Value Date    CALCIUM 9.9 11/04/2022    PHOS 4.1 11/15/2017    PROT 7.4 11/04/2022    ALBUMIN 4.6  11/04/2022    AST 23 11/04/2022    ALT 29 11/04/2022    ALKPHOS 109 11/04/2022    BILITOT 1.0 11/04/2022       LIPID PANEL -   Lab Results   Component Value Date    CHOL 154 12/06/2023    TRIG 75 12/06/2023    HDL 57.5 12/06/2023    CHHDL 2.7 12/06/2023    LDLF 98 11/04/2022    VLDL 15 12/06/2023    NHDL 97 12/06/2023       RENAL FUNCTION PANEL -   Lab Results   Component Value Date    GLUCOSE 106 (H) 11/04/2022     11/04/2022    K 4.4 11/04/2022     11/04/2022    CO2 28 11/04/2022    ANIONGAP 12 11/04/2022    BUN 24 (H) 11/04/2022    CREATININE 0.71 11/04/2022    CALCIUM 9.9 11/04/2022    PHOS 4.1 11/15/2017    ALBUMIN 4.6 11/04/2022        Lab Results   Component Value Date    HGBA1C 5.6 11/04/2022    HGBA1C 5.6 08/14/2020       Last Cardiology Tests:  ECG:    Echo:  Echocardiogram 12/2023  1. Left ventricular systolic function is normal with a 60-65% estimated ejection fraction.   2. Spectral Doppler shows an impaired relaxation pattern of left ventricular diastolic filling.   3. There is trace mitral and tricuspid regurgitation.    Ejection Fractions:  LVEF 60-65%    Cath:    Stress Test:    Cardiac Imaging:  CT calcium score 2020   LM: 0.  LAD: 109.9. Previously 37.3.  LCx: 72.9. Previously 28.7.  RCA: 40.1. Previously 12.5.     Total: 222.9. Previously 78.4.    Assessment/Plan   Mrs. Taylor is a very pleasant 69 year old female with a history of HLD and heart palpitations, complains of heart pounding when laying on the left side. Monitor showed sinus rhythm. Echocardiogram shows an LV function. Carotid ultrasound is normal. She was given reassurance. Heart rate and blood pressure is well controlled today.       Plan   -call with any questions   -continue Propanolol LA 80 mg daily and Crestor 5 mg daily   -follow up when needed       Joyce Templeton, APRN-CNP

## 2024-12-02 ENCOUNTER — APPOINTMENT (OUTPATIENT)
Dept: PHYSICAL MEDICINE AND REHAB | Facility: CLINIC | Age: 69
End: 2024-12-02
Payer: MEDICARE

## 2024-12-09 ENCOUNTER — APPOINTMENT (OUTPATIENT)
Dept: PHYSICAL MEDICINE AND REHAB | Facility: CLINIC | Age: 69
End: 2024-12-09
Payer: MEDICARE

## 2024-12-10 ENCOUNTER — APPOINTMENT (OUTPATIENT)
Dept: PHYSICAL MEDICINE AND REHAB | Facility: CLINIC | Age: 69
End: 2024-12-10
Payer: MEDICARE

## 2024-12-18 ENCOUNTER — TELEPHONE (OUTPATIENT)
Dept: HEMATOLOGY/ONCOLOGY | Facility: CLINIC | Age: 69
End: 2024-12-18
Payer: MEDICARE

## 2024-12-18 NOTE — TELEPHONE ENCOUNTER
Left message on general voicemail that I was calling about test results and to call back at her earliest convenience.     *BCI testing shows an overall risk of recurrence 9.8%, and a later recurrence (years 5-10) to be 5.4%. She will NOT benefit with extended thearpy. She should continue anastrozole through July 2025 and then discontinue*

## 2024-12-19 DIAGNOSIS — E78.5 HYPERLIPIDEMIA, UNSPECIFIED: ICD-10-CM

## 2024-12-19 RX ORDER — ROSUVASTATIN CALCIUM 5 MG/1
5 TABLET, COATED ORAL DAILY
Qty: 90 TABLET | Refills: 1 | Status: SHIPPED | OUTPATIENT
Start: 2024-12-19

## 2024-12-26 ENCOUNTER — TELEPHONE (OUTPATIENT)
Dept: HEMATOLOGY/ONCOLOGY | Facility: CLINIC | Age: 69
End: 2024-12-26
Payer: MEDICARE

## 2024-12-26 NOTE — TELEPHONE ENCOUNTER
Left message that I was calling again about her recent test results. I see that she did view the results. If she has any questions she can call back to discuss. Silvia

## 2025-01-06 ENCOUNTER — OFFICE VISIT (OUTPATIENT)
Dept: PHYSICAL MEDICINE AND REHAB | Facility: CLINIC | Age: 70
End: 2025-01-06
Payer: MEDICARE

## 2025-01-06 VITALS
TEMPERATURE: 97.7 F | HEART RATE: 80 BPM | BODY MASS INDEX: 32.26 KG/M2 | WEIGHT: 176.4 LBS | RESPIRATION RATE: 20 BRPM | SYSTOLIC BLOOD PRESSURE: 128 MMHG | DIASTOLIC BLOOD PRESSURE: 76 MMHG

## 2025-01-06 DIAGNOSIS — M53.3 SACROILIAC JOINT PAIN: Primary | ICD-10-CM

## 2025-01-06 DIAGNOSIS — M48.061 SPINAL STENOSIS, LUMBAR REGION, WITHOUT NEUROGENIC CLAUDICATION: ICD-10-CM

## 2025-01-06 PROCEDURE — 99214 OFFICE O/P EST MOD 30 MIN: CPT | Performed by: PHYSICAL MEDICINE & REHABILITATION

## 2025-01-06 PROCEDURE — 1159F MED LIST DOCD IN RCRD: CPT | Performed by: PHYSICAL MEDICINE & REHABILITATION

## 2025-01-06 PROCEDURE — G2211 COMPLEX E/M VISIT ADD ON: HCPCS | Performed by: PHYSICAL MEDICINE & REHABILITATION

## 2025-01-06 PROCEDURE — 1125F AMNT PAIN NOTED PAIN PRSNT: CPT | Performed by: PHYSICAL MEDICINE & REHABILITATION

## 2025-01-06 PROCEDURE — 1036F TOBACCO NON-USER: CPT | Performed by: PHYSICAL MEDICINE & REHABILITATION

## 2025-01-06 PROCEDURE — 1160F RVW MEDS BY RX/DR IN RCRD: CPT | Performed by: PHYSICAL MEDICINE & REHABILITATION

## 2025-01-06 ASSESSMENT — PAIN SCALES - GENERAL: PAINLEVEL_OUTOF10: 1

## 2025-01-06 NOTE — PROGRESS NOTES
Physical Medicine and Rehabilitation MSK Consult  01/06/25       Chief Complaint:  Low back pain     HPI:  Joe Taylor is a  69 y.o. F who presents to the clinic today  for evaluation of low back pain.  Onset: started in August 2024;   No particular etiology  Location: R lower back  Radiation: intermittent down the R leg, lateral thigh come and goes and lower lateral leg  Quality: sharp  Feels  it goes out of align  Duration: comes and goes  Aggravating factors:  prolonged sitting especially on toilet, pain w sit to stand and feels a click and gets into malalignment  Alleviating factors: stretches and exercises  Severity: 10  Numbness/Tingling: Yes - burns in the of the R foot hamstring and calf stretches burning for months  Bowel or bladder incontinence: No  Pt's current medication regimen includes: medrol pack w mild relief, mobic alternates w  ibuprofen     Treatment to date:  Physical therapy: Yes   Medications taken to date for this complaint include the following:   Mild relief w zanaflex  Prior injections: L4 in 1980s        Of note has a ho of breast cancer L, lumpectomy and radiation,. No chemotherapy but on arimidex.    Sij does help.       She was last seen in October 2024, at that time we discussed:  Xr sij  Reviewed MRI results. Does have severe spinal stenosis L2/3, no neuro deficits, reviewed red flags of cord compression  Started PT. Continue w core strengthening rom, continue SIJ belt for symptomatic relief  Mobic prn, ok to take 2 on bad day  Fu 6 weeks, wants to hold off injections for now  She went to [facility]    She is learning how to manage doing her exercises. She is doing her HEP.  Xr sij does show mild oa. Takes mobic prn. She is trying to correct her posture as well.  Has generalized arthralgias and worried re osteopenia since on arimidex. Not interested in other meds     Imaging  Reviewed 01/06/25 w patient and personally  Xr sij 11/2024  Reviewed personally and  personally. Mild degenerative changes in both sij.  FINDINGS:  Mild osteoarthritis bilateral sacroiliac joints.      No erosive changes or ankylosis.      IMPRESSION:  Sacroiliac osteoarthritis  Mri L spine 10/2024  MR LUMBAR SPINE WO IV CONTRAST;  10/9/2024 11:07 am      INDICATION:  Signs/Symptoms:weakness RLE, pain in back.      ,M54.16 Radiculopathy, lumbar region      COMPARISON:  None.      ACCESSION NUMBER(S):  [id number]      ORDERING CLINICIAN:  KITTY SIMPSON      TECHNIQUE:  Sagittal T1, T2, STIR, axial T1 and T2 weighted images of the lumbar  spine were acquired.      FINDINGS:  This report assumes 5 non rib-bearing lumbar vertebral bodies. The  lowest intervertebral disc will be labeled L5-S1.      Alignment: There is straightening of the normal lumbar lordosis.  Coronal  images demonstrate a slight dextrocurvature of the  lumbar spine. There is a 4 mm L5-S1 anterolisthesis.      Vertebrae/Intervertebral Discs:      The vertebral bodies demonstrate expected height. No evidence of an  acute fracture. There is a 1.3 cm T1 hypointense, T2 and STIR  hyperintense lesion within the anterior L1 vertebral body (series 2,  image 13) compatible with mildly atypical hemangioma. Scattered oval  T1 and T2 hyperintense lesions throughout the lumbar spine are most  compatible with intraosseous hemangiomas. Multilevel disc desiccation  and disc height loss with endplate degenerative changes and  osteophytic spurring. There is moderate to severe L2-L3 and L3-L4  disc height loss. Mild STIR hyperintense signal along the L2-L3  endplate/vertebral bodies most compatible with reactive  hypervascularity or osseous edema.      Conus medullaris: The lower thoracic cord appears unremarkable. The  conus medullaris terminates at L1.      T11-T12: Sagittal images demonstrate no significant spinal canal or  neural foraminal stenosis.      T12-L1: Small disc bulge without significant spinal canal or neural  foraminal  stenosis.      L1-2: Small disc bulge contributing to mild spinal canal stenosis.  There is effacement of the right subarticular recess. Mild right and  no significant left neural foraminal stenosis.      L2-3: Disc bulge with osteophyte spurring with a superimposed left  central/paracentral disc extrusion with superior migration along the  posterior L2 vertebral body. Prominent dorsal epidural fat and mild  ligamentum flavum hypertrophy. There is severe spinal canal stenosis.  There is effacement of the bilateral subarticular recess. Moderate  left and mild right neural foraminal stenosis.      L3-4: Disc bulge with osteophyte spurring, prominent dorsal epidural  fat and ligamentum flavum hypertrophy contributing to moderate spinal  canal stenosis. There is effacement of the bilateral subarticular  recess. There is moderate bilateral neural foraminal stenosis,  left-greater-than-right.      L4-5: Disc bulge with a superimposed central disc herniation. Facet  osteoarthrosis, ligamentum flavum hypertrophy and prominent dorsal  epidural fat. There is moderate spinal canal stenosis. There is  effacement of the bilateral subarticular recess. There is severe  right and mild left neural foraminal stenosis.      L5-S1:  Anterolisthesis with uncovering of the intervertebral disc  with a superimposed disc bulge there is prominent epidural fat  contributing to mild spinal canal stenosis. Mild bilateral neural  foraminal stenosis. Trace right facet joint effusion with mild  periarticular edema.      The prevertebral and paraspinal musculature are unremarkable.      IMPRESSION:  1. Multilevel degenerative changes as above resulting in neural  foraminal stenosis most pronounced at left L2-L3, bilateral L3-L4,  right L4-L5 and spinal canal stenosis most pronounced at L2-L3, L3-L4  and L4-L5. Multilevel subarticular recess effacement as described  above.  2. Trace right L5-S1 facet joint effusion with mild periarticular  edema may  be reactive in etiology. An underlying infection can not be  excluded on the basis of this examination. Correlation with patient's  symptomology and laboratory values is recommended.  3. A 1.3 cm T1 isointense and T2 and STIR hyperintense lesion within  the anterior L1 vertebral body, compatible with mildly atypical  hemangioma.          Past Medical History:   Diagnosis Date    Arthritis     Asthenopia, bilateral 06/26/2023    Autoimmune thyroiditis     Hashimoto's thyroiditis    Baker's cyst     Bilateral knee swelling 06/26/2023    Cochlear hydrops, right 06/26/2023    CTS (carpal tunnel syndrome)     Disease of thyroid gland     Disorder of thyroid, unspecified     Thyroid trouble    Epistaxis 06/26/2023    Herpes     Hyperopia with presbyopia 06/26/2023    Hyperthyroidism     Hypothyroidism     Impingement syndrome of left shoulder 06/26/2023    Infection, skin 06/26/2023    Joint pain, knee 06/26/2023    Left breast mass 06/26/2023    Lipoma 06/26/2023    Lumbosacral disc disease     Mild reactive airways disease ([facility]) 06/26/2023    Motion sickness 06/26/2023    Otalgia of both ears 06/26/2023    Other conditions influencing health status     Temporomandibular Joint-pain Dysfunction Syndrome    Other conditions influencing health status     Ulcer    Pain, joint, shoulder 06/26/2023    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    Personal history of other diseases of the digestive system     History of esophageal reflux    Personal history of other diseases of the nervous system and sense organs 10/26/2015    History of hearing loss    Personal history of other diseases of the respiratory system     Personal history of asthma    Personal history of other endocrine, nutritional and metabolic disease     History of hyperlipidemia    Personal history of other specified conditions 03/13/2020    History of abnormal mammogram    Refractive error 06/26/2023    Rh incompatibility     Rotator cuff  syndrome     Scapular dyskinesis 06/26/2023    Subluxation of patella     Tachycardia 06/26/2023        Past Surgical History:   Procedure Laterality Date    APPENDECTOMY  11/22/2013    Appendectomy    BREAST BIOPSY  3/1/2020    BREAST LUMPECTOMY  4/8/2020    OTHER SURGICAL HISTORY  11/22/2013    Ovarian Cystectomy    TOTAL THYROIDECTOMY  03/26/2014    Thyroid Surgery Total Thyroidectomy        Patient Active Problem List    Diagnosis Date Noted    Encounter for monitoring aromatase inhibitor therapy 11/20/2024    History of external beam radiation therapy 11/20/2024    Abnormal josé miguel-acoustic emission test 08/14/2024    Acute vulvitis 08/14/2024    History of elevated lipids 08/14/2024    Hamstring injury 08/14/2024    Dizziness 08/14/2024    Contusion of shoulder 08/14/2024    Chronic cough 08/14/2024    Biceps tendinitis 08/14/2024    Achilles tendinitis 08/14/2024    Impingement syndrome of shoulder region 08/14/2024    Obesity with body mass index 30 or greater 08/14/2024    Obesity 08/14/2024    Pain of toe 08/14/2024    Subjective tinnitus of both ears 08/14/2024    Swelling of finger joint 08/14/2024    Arthralgia of knee 08/14/2024    Arthralgia of shoulder 08/14/2024    Swelling of knee 08/14/2024    Hyperopia 08/14/2024    Reactive airway disease ([facility]) 08/14/2024    Use of anastrozole 07/01/2024    Chronic right shoulder pain 06/03/2024    Weight gain 06/03/2024    Healthcare maintenance 12/05/2023    PCO (posterior capsular opacification), bilateral 11/07/2023    Hereditary drusen, bilateral 10/24/2023    Epiretinal membrane (ERM) of right eye 10/24/2023    Dry eye syndrome of both eyes 10/24/2023    Pseudophakia of both eyes 10/24/2023    Anxiety 06/26/2023    Asthma 06/26/2023    Dry eyes 06/26/2023    Malignant neoplasm of breast 06/26/2023    Localized osteoarthrosis 06/26/2023    Dental caries 06/26/2023    Hyperglycemia 06/26/2023    Epistaxis 06/26/2023    Fatigue 06/26/2023    Ganglion of joint  06/26/2023    Hashimoto's thyroiditis 06/26/2023    Hyperlipidemia 06/26/2023    Disorder of thyroid 06/26/2023    Infection of skin 06/26/2023    Mass of breast 06/26/2023    Lipoma 06/26/2023    Injury of low back 06/26/2023    Lyme disease 06/26/2023    Meniere's disease 06/26/2023    Motion sickness 06/26/2023    Otalgia of both ears 06/26/2023    Palpitations 06/26/2023    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    Rib pain 06/26/2023    Lumbar radiculopathy 06/26/2023    Follicular adenoma of thyroid gland 06/26/2023    Chronic ethmoidal sinusitis 06/23/2022    Seasonal allergic rhinitis 06/23/2022    Chronic rhinitis 05/31/2022    Loss of sense of smell 05/31/2022    History of cancer of left breast 08/06/2020    History of thyroid cancer 08/06/2020    Mixed dyslipidemia 08/06/2020    Postoperative hypothyroidism 08/06/2020    Primary osteoarthritis of both knees 10/26/2018    Chronic pain of both knees 04/23/2018    Adenomatous polyp of colon 03/27/2018   Breast htn   Thyroid cancer  Hlp  Palpitations, dysautonomia     Family History   Problem Relation Name Age of Onset    Stroke Mother Michelle Taylor     Anesthesia related problems Mother Michelle Reyeszian     Arthritis Mother Michelle Reyeszian     Anesthesia problems Mother Michelle Taylor     Aneurysm Father Patricio Bajaksouzian     Arthritis Father Patricio Bajaksouzian     Cancer Father Patricio Bajaksouzian     Heart disease Father Patricio Bajaksouzian     Hyperlipidemia Father Patricio Bajaksouzian     Colon cancer Mother's Brother Nolan Brady     Breast cancer Father's Sister          Current Outpatient Medications   Medication Sig Dispense Refill    anastrozole (Arimidex) 1 mg tablet Take 1 tablet (1 mg total) by mouth once daily.  Swallow whole with a drink of water. 90 tablet 3    levothyroxine (Synthroid, Levoxyl) 75 mcg tablet Take 1 tablet (75 mcg) by mouth once daily.      liothyronine (Cytomel) 5 mcg tablet Take 3 tablets (15 mcg) by  mouth once daily.      meloxicam (Mobic) 7.5 mg tablet Take 1 tablet (7.5 mg) by mouth 2 times a day as needed for moderate pain (4 - 6). 30 tablet 11    propranolol (Inderal) 10 mg tablet Take 1 tablet (10 mg) by mouth 2 times a day as needed (palpitations). 60 tablet 11    propranolol LA (Inderal LA) 80 mg 24 hr capsule TAKE 1 CAPSULE BY MOUTH ONCE DAILY 90 capsule 2    rosuvastatin (Crestor) 5 mg tablet TAKE 1 TABLET BY MOUTH ONCE DAILY 90 tablet 1    anastrozole (Arimidex) 1 mg tablet Take 1 tablet (1 mg total) by mouth once daily.  Swallow whole with a drink of water. 90 tablet 3    cholecalciferol, vitD3,/vit K2 (vitamin D3-vitamin K2) 125-90 mcg capsule Take by mouth. (Patient not taking: Reported on 1/6/2025)      clobetasol (Temovate) 0.05 % ointment Apply topically 2 times a day. Apply topically twice weekly sparingly. (Patient not taking: Reported on 1/6/2025) 15 g 2    coenzyme Q-10 (Co Q-10) 100 mg capsule Take 1 capsule (100 mg) by mouth once daily. (Patient not taking: Reported on 1/6/2025)      MAGNESIUM GLYCINATE ORAL Take 500 mg by mouth once daily. (Patient not taking: Reported on 1/6/2025)      tiZANidine (Zanaflex) 4 mg tablet Take 1 tablet (4 mg) by mouth every 6 hours if needed for muscle spasms. 40 tablet 0    vitamin B complex (B Complex-Vitamin B12) tablet Take 1 tablet by mouth once daily. (Patient not taking: Reported on 1/6/2025)      vitamin E 180 mg (400 unit) capsule Take 1 capsule (400 Units) by mouth once daily. (Patient not taking: Reported on 1/6/2025)      vitamin K2 180 mcg capsule Take 1 capsule by mouth once daily. (Patient not taking: Reported on 1/6/2025)       No current facility-administered medications for this visit.        Allergies   Allergen Reactions    Sulfa (Sulfonamide Antibiotics) Swelling and Rash    Codeine Dizziness and Other    Mold Unknown    Tree And Shrub Pollen Unknown        Social History     Socioeconomic History    Marital status:    Tobacco  Use    Smoking status: Former     Current packs/day: 0.00     Types: Cigarettes    Smokeless tobacco: Never    Tobacco comments:     Only smoked in college   Vaping Use    Vaping status: Never Used   Substance and Sexual Activity    Alcohol use: Not Currently     Comment: has had a in 50 years    Drug use: Never    Sexual activity: Not Currently     Partners: Male     Birth control/protection: Condom Male, Diaphragm, Post-menopausal, Spermicide, Sponge, None   Lives w   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   Eyes:  Denies change in visual acuity   HENT:  Denies nasal congestion or sore throat   Respiratory:  Denies cough or shortness of breath   Cardiovascular:  Denies chest pain or edema   GI:  Denies abdominal pain, nausea, vomiting, bloody stools or diarrhea   :  Denies dysuria   Integument:  Denies rash   Neurologic:  As per HPI  MSK: Per above HPI  Endocrine:  Denies polyuria or polydipsia   Lymphatic:  Denies swollen glands   Psychiatric:  Denies depression or anxiety            PHYSICAL EXAM:  /76 (BP Location: Right arm, Patient Position: Sitting)   Pulse 80   Temp 36.5 °C (97.7 °F)   Resp 20   Wt 80 kg (176 lb 6.4 oz)   BMI 32.26 kg/m²     General:  NAD, well developed, f      Psychiatric: appropriate mood & affect.   Cardiovascular:  Normal pedal pulses; no LE edema.  Respiratory:  Normal rate; unlabored breathing.  Skin:  Intact; no erythema; no ecchymosis or rash.  Lymphatic:  No lymphadenopathy or lymphedema.  NEURO:  Alert and appropriate. Speech fluent, conversing appropriately.   Motor:    Rt: HF 5/5, KE 5/5, KF 5/5, DF 5/5, EHL 5/5, PF 5/5.    Lt: HF 5/5, KE 5/5, KF 5/5, DF 5/5, EHL 5/5, PF 5/5.  Sensation:     Light touch: intact in the b/l L3-S1 dermatomes.    PP: intact in the b/l L3-S1 dermatomes  Reflexes:     Right:  patellar 2+, achilles 2+,     Left:  patellar 2+, achilles 2+,     Babinski's downgoing b/l; no clonus  Gait: Normal, narrow based gait.     MSK:  Inspection reveals no evidence of a pelvic obliqity   Spinal range of motion: Flexion to 70° degrees, Extension of 10 degrees.  There is tenderness over the R psis.   Special tests:    Straight leg raise: - bl    Slump test: -bl    Facet loading: -bl  Stork test _ gaelns negative bl  Assymetry in bl psis     Dominic and farid - bl  Restriction due to adductor tightness bl     Impression: Joe Taylor is a 69 y.o. F w pmh of hlp, breast cancer sp lumpectomy and radiation on arimidex, thyroid cancer sp thyroidectomy presenting with lower back pain. MRI w severe stenosis L2/3 and sij w mild sij arthritis     Plan:  5. Can consider cymbalta for arthralgia due to arimidex, but wants to hold off  Xr sij reviewed w patient and personally shows mild arthritis  Reviewed MRI results. Does have severe spinal stenosis L2/3, no neuro deficits, reviewed red flags of cord compression  Continue HEP and posture medication, wants to hold off on injections   Mobic prn, doesn't take much doesn't need refills  Fu 6 weeks, wants to hold off injections for now  Interested in getting back into more of exercise routine, gave info re [facility] program and survivorship  Fu prn     Daysi Drake MD  Physical Medicine and Rehabilitation

## 2025-01-10 ENCOUNTER — APPOINTMENT (OUTPATIENT)
Dept: RHEUMATOLOGY | Facility: CLINIC | Age: 70
End: 2025-01-10
Payer: MEDICARE

## 2025-01-15 ENCOUNTER — APPOINTMENT (OUTPATIENT)
Dept: RHEUMATOLOGY | Facility: CLINIC | Age: 70
End: 2025-01-15
Payer: MEDICARE

## 2025-01-17 ENCOUNTER — APPOINTMENT (OUTPATIENT)
Dept: RHEUMATOLOGY | Facility: CLINIC | Age: 70
End: 2025-01-17
Payer: MEDICARE

## 2025-02-12 ENCOUNTER — APPOINTMENT (OUTPATIENT)
Dept: PRIMARY CARE | Facility: CLINIC | Age: 70
End: 2025-02-12
Payer: MEDICARE

## 2025-02-24 ENCOUNTER — APPOINTMENT (OUTPATIENT)
Dept: OBSTETRICS AND GYNECOLOGY | Facility: CLINIC | Age: 70
End: 2025-02-24
Payer: MEDICARE

## 2025-02-26 ENCOUNTER — APPOINTMENT (OUTPATIENT)
Dept: PRIMARY CARE | Facility: CLINIC | Age: 70
End: 2025-02-26
Payer: MEDICARE

## 2025-03-03 ENCOUNTER — APPOINTMENT (OUTPATIENT)
Dept: RHEUMATOLOGY | Facility: CLINIC | Age: 70
End: 2025-03-03
Payer: MEDICARE

## 2025-03-03 VITALS
SYSTOLIC BLOOD PRESSURE: 132 MMHG | HEART RATE: 83 BPM | OXYGEN SATURATION: 96 % | WEIGHT: 179.6 LBS | BODY MASS INDEX: 32.84 KG/M2 | DIASTOLIC BLOOD PRESSURE: 85 MMHG

## 2025-03-03 DIAGNOSIS — M85.88 OSTEOPENIA OF LUMBAR SPINE: ICD-10-CM

## 2025-03-03 PROCEDURE — 99203 OFFICE O/P NEW LOW 30 MIN: CPT | Performed by: INTERNAL MEDICINE

## 2025-03-03 PROCEDURE — 1159F MED LIST DOCD IN RCRD: CPT | Performed by: INTERNAL MEDICINE

## 2025-03-03 PROCEDURE — 1125F AMNT PAIN NOTED PAIN PRSNT: CPT | Performed by: INTERNAL MEDICINE

## 2025-03-03 PROCEDURE — 1036F TOBACCO NON-USER: CPT | Performed by: INTERNAL MEDICINE

## 2025-03-03 ASSESSMENT — PAIN SCALES - GENERAL: PAINLEVEL_OUTOF10: 2

## 2025-03-03 ASSESSMENT — ENCOUNTER SYMPTOMS
BACK PAIN: 1
ARTHRALGIAS: 1
DEPRESSION: 0

## 2025-03-03 NOTE — PROGRESS NOTES
Subjective   Patient ID: Joe Taylor is a 69 y.o. female who presents for New Patient Visit.  HPI  Patient referred For history of osteopenia of the spine.  She has a history of breast cancer status post lumpectomy and radiation, estrogen receptor positive on aromatase inhibitor therapy since 2020.  Also has a history of thyroidectomy, papillary cancer and Hashimoto's thyroiditis.  DEXA 9/5/2024 lumbar spine -1.4, left total hip -1.1, left femoral neck -2.1  DEXA 8/29/2022 lumbar spine -0.8, left total hip - -1.1, left femoral neck -2.0  DEXA 12/30/2015 lumbar spine -0.5, left total hip 1.0, left femoral neck -1.4    She recently was taken off of anastrozole and now she does not have as much back discomfort or muscle weakness.  She feels a little bit stronger.    She has had chronic back issues and did have an MRI that noted multilevel degenerative changes with neural foraminal stenosis as well as spinal canal stenosis.    She does have a history of some fractures caused by horses before she went through menopause.  She went through menopause at the age of 54 and no hormone replacement therapy.  There is a positive family history of osteoporosis in her grandmother.    She has had difficulty losing weight.  She does eat a lot of dairy.    For her back she has been on meloxicam and Motrin and had been at physical therapy this past year.    In the past she has been diagnosed with dysautonomia and has been on chronic propranolol.  She was having some increase in palpitations but discontinued coffee and it has improved.  She does take care of of the barn and they have horses but does not do as much walking as she should.      Social history is negative for tobacco, alcohol, illicit drug use.  Has worked as a research microbiologist previously.      Review of Systems   Constitutional:         Weight gain difficult to lose   Musculoskeletal:  Positive for arthralgias and back pain.       Objective   Physical  Exam  GEN: NAD A&O x3 appropriate affect  Assessment/Plan        Osteopenia worsened in the left femoral neck on her recent bone density in September.  She is no longer on anastrozole and a lot of her musculoskeletal symptoms have improved.  At this time I feel that we can just watch her bone density and she can try increasing her weightbearing activity.  She does get calcium and vitamin D through dairy but this may be also contributing to her weight gain.  We discussed about her diet.  We also discussed about increasing muscle strength.    Can come back on an as-needed basis.    Yadira Abel MD 03/03/25 11:08 AM

## 2025-03-07 ENCOUNTER — APPOINTMENT (OUTPATIENT)
Dept: PRIMARY CARE | Facility: CLINIC | Age: 70
End: 2025-03-07
Payer: MEDICARE

## 2025-03-13 DIAGNOSIS — I10 PRIMARY HYPERTENSION: ICD-10-CM

## 2025-03-16 RX ORDER — PROPRANOLOL HYDROCHLORIDE 80 MG/1
80 CAPSULE, EXTENDED RELEASE ORAL DAILY
Qty: 90 CAPSULE | Refills: 2 | Status: SHIPPED | OUTPATIENT
Start: 2025-03-16

## 2025-03-31 ENCOUNTER — APPOINTMENT (OUTPATIENT)
Facility: CLINIC | Age: 70
End: 2025-03-31
Payer: MEDICARE

## 2025-04-02 ENCOUNTER — APPOINTMENT (OUTPATIENT)
Dept: PRIMARY CARE | Facility: CLINIC | Age: 70
End: 2025-04-02
Payer: MEDICARE

## 2025-04-03 ENCOUNTER — APPOINTMENT (OUTPATIENT)
Dept: PRIMARY CARE | Facility: CLINIC | Age: 70
End: 2025-04-03
Payer: MEDICARE

## 2025-04-24 ENCOUNTER — APPOINTMENT (OUTPATIENT)
Dept: PRIMARY CARE | Facility: CLINIC | Age: 70
End: 2025-04-24
Payer: MEDICARE

## 2025-04-29 ENCOUNTER — APPOINTMENT (OUTPATIENT)
Dept: PRIMARY CARE | Facility: CLINIC | Age: 70
End: 2025-04-29
Payer: MEDICARE

## 2025-04-29 VITALS
HEART RATE: 77 BPM | OXYGEN SATURATION: 96 % | BODY MASS INDEX: 31.82 KG/M2 | SYSTOLIC BLOOD PRESSURE: 120 MMHG | DIASTOLIC BLOOD PRESSURE: 70 MMHG | WEIGHT: 174 LBS | TEMPERATURE: 97.3 F

## 2025-04-29 DIAGNOSIS — D12.6 ADENOMATOUS POLYP OF COLON, UNSPECIFIED PART OF COLON: ICD-10-CM

## 2025-04-29 DIAGNOSIS — R74.8 ELEVATED ALKALINE PHOSPHATASE LEVEL: Primary | ICD-10-CM

## 2025-04-29 DIAGNOSIS — R73.9 HYPERGLYCEMIA: ICD-10-CM

## 2025-04-29 DIAGNOSIS — E78.2 MIXED HYPERLIPIDEMIA: ICD-10-CM

## 2025-04-29 DIAGNOSIS — D12.8 TUBULAR ADENOMA OF RECTUM: ICD-10-CM

## 2025-04-29 PROCEDURE — G0439 PPPS, SUBSEQ VISIT: HCPCS | Performed by: FAMILY MEDICINE

## 2025-04-29 PROCEDURE — 1036F TOBACCO NON-USER: CPT | Performed by: FAMILY MEDICINE

## 2025-04-29 PROCEDURE — 1126F AMNT PAIN NOTED NONE PRSNT: CPT | Performed by: FAMILY MEDICINE

## 2025-04-29 PROCEDURE — 1158F ADVNC CARE PLAN TLK DOCD: CPT | Performed by: FAMILY MEDICINE

## 2025-04-29 PROCEDURE — 1160F RVW MEDS BY RX/DR IN RCRD: CPT | Performed by: FAMILY MEDICINE

## 2025-04-29 PROCEDURE — 1123F ACP DISCUSS/DSCN MKR DOCD: CPT | Performed by: FAMILY MEDICINE

## 2025-04-29 PROCEDURE — 1159F MED LIST DOCD IN RCRD: CPT | Performed by: FAMILY MEDICINE

## 2025-04-29 PROCEDURE — 1170F FXNL STATUS ASSESSED: CPT | Performed by: FAMILY MEDICINE

## 2025-04-29 ASSESSMENT — ACTIVITIES OF DAILY LIVING (ADL)
GROCERY_SHOPPING: INDEPENDENT
TAKING_MEDICATION: INDEPENDENT
BATHING: INDEPENDENT
MANAGING_FINANCES: INDEPENDENT
DOING_HOUSEWORK: INDEPENDENT
DRESSING: INDEPENDENT

## 2025-04-29 ASSESSMENT — ENCOUNTER SYMPTOMS
UNEXPECTED WEIGHT CHANGE: 0
LIGHT-HEADEDNESS: 0
DYSPHORIC MOOD: 0
TREMORS: 0
SHORTNESS OF BREATH: 0
FEVER: 0
BRUISES/BLEEDS EASILY: 0
BACK PAIN: 1
HEMATURIA: 0
DIARRHEA: 0
FATIGUE: 0
COUGH: 0
VOMITING: 0
BLOOD IN STOOL: 0
DYSURIA: 0
ABDOMINAL PAIN: 0
WEAKNESS: 0
JOINT SWELLING: 0
NAUSEA: 0
PALPITATIONS: 0
SINUS PAIN: 0
TROUBLE SWALLOWING: 0

## 2025-04-29 ASSESSMENT — PATIENT HEALTH QUESTIONNAIRE - PHQ9
1. LITTLE INTEREST OR PLEASURE IN DOING THINGS: NOT AT ALL
2. FEELING DOWN, DEPRESSED OR HOPELESS: NOT AT ALL
SUM OF ALL RESPONSES TO PHQ9 QUESTIONS 1 AND 2: 0
SUM OF ALL RESPONSES TO PHQ9 QUESTIONS 1 AND 2: 0
2. FEELING DOWN, DEPRESSED OR HOPELESS: NOT AT ALL
1. LITTLE INTEREST OR PLEASURE IN DOING THINGS: NOT AT ALL

## 2025-04-29 ASSESSMENT — PAIN SCALES - GENERAL: PAINLEVEL_OUTOF10: 0-NO PAIN

## 2025-04-29 NOTE — PROGRESS NOTES
Subjective   Reason for Visit: Joe Taylor is an 70 y.o. female here for a Medicare Wellness visit.          Reviewed all medications by prescribing practitioner or clinical pharmacist (such as prescriptions, OTCs, herbal therapies and supplements) and documented in the medical record.    Joe Taylor comes in today for medicare wellness.  There has been no chest pain, shortness of breath, fever, chills, unexplained weight loss, rectal bleeding or any other unusual symptoms. Reviewed chronic medical issues. Has chronic back pain. History of elevated alk phos, LFT.  Body mass index is 31.82 kg/m². Most recent labs, diagnostics and pertinent information has been reviewed. Will update if necessary.  Patient is attempting to eat a healthy diet and incorporate exercise in to their lifestyle. Patient does not smoke. Patient is practicing routine eye and dental care. Reviewed employment status. Reviewed current medications, if any. Immunizations reviewed and updated if appropriate.  Last Labs:     CMP: Lab Results       Component                Value               Date                       CALCIUM                  9.9                 11/04/2022                 CALCIUM                  9.7                 05/23/2022                 PHOS                     4.1                 11/15/2017                 PROT                     7.4                 11/04/2022                 PROT                     7.1                 05/23/2022                 ALBUMIN                  4.6                 11/04/2022                 ALBUMIN                  4.2                 05/23/2022                 AST                      23                  11/04/2022                 AST                      25                  05/23/2022                 ALKPHOS                  109                 11/04/2022                 ALKPHOS                  102                 05/23/2022                 BILITOT                  1.0                  11/04/2022                 BILITOT                  0.8                 05/23/2022            CBC:   Lab Results       Component                Value               Date                       WBC                      4.3 (L)             12/06/2023                 WBC                      3.9 (L)             11/04/2022                 WBC                      4.1 (L)             05/23/2022                 HGB                      13.6                12/06/2023                 HGB                      14.0                11/04/2022                 HGB                      13.2                05/23/2022                 HCT                      43.0                12/06/2023                 HCT                      42.3                11/04/2022                 HCT                      41.4                05/23/2022                 MCV                      86                  12/06/2023                 MCV                      86                  11/04/2022                 MCV                      86                  05/23/2022                 PLT                      223                 12/06/2023                 PLT                      247                 11/04/2022                 PLT                      230                 05/23/2022            A1C:   Lab Results       Component                Value               Date                       HGBA1C                   5.6                 11/04/2022                 HGBA1C                   5.5                 05/23/2022                 HGBA1C                   5.6                 08/14/2020            LIPID PANEL:   Lab Results       Component                Value               Date                       CHOL                     154                 12/06/2023                 CHOL                     171                 11/04/2022                 CHOL                     166                 05/23/2022                 TRIG                     75                   "12/06/2023                 TRIG                     49                  11/04/2022                 TRIG                     60                  05/23/2022                 HDL                      57.5                12/06/2023                 HDL                      62.8                11/04/2022                 HDL                      61.6                05/23/2022                 CHHDL                    2.7                 12/06/2023                 CHHDL                    2.7                 11/04/2022                 CHHDL                    2.7                 05/23/2022                 LDLF                     98                  11/04/2022                 LDLF                     92                  05/23/2022                 VLDL                     15                  12/06/2023                 VLDL                     10                  11/04/2022                 VLDL                     12                  05/23/2022                 NHDL                     97                  12/06/2023            TSH:   Lab Results       Component                Value               Date                       TSH                      0.20 (L)            12/06/2023                 TSH                      0.90                11/04/2022                 TSH                      0.61                01/14/2022            PSA:   No results found for: \"PSA\"           Patient Care Team:  Elise Mack MD as PCP - General  Elise Mack MD as PCP - O Medicare Advantage PCP     Review of Systems   Constitutional:  Negative for fatigue, fever and unexpected weight change.   HENT:  Negative for congestion, ear pain, nosebleeds, sinus pain and trouble swallowing.    Eyes:  Negative for visual disturbance.   Respiratory:  Negative for cough and shortness of breath.    Cardiovascular:  Negative for chest pain and palpitations.   Gastrointestinal:  Negative for abdominal pain, blood in stool, diarrhea, nausea and " vomiting.   Genitourinary:  Negative for dysuria and hematuria.   Musculoskeletal:  Positive for back pain. Negative for gait problem and joint swelling.   Neurological:  Negative for tremors, weakness and light-headedness.   Hematological:  Does not bruise/bleed easily.   Psychiatric/Behavioral:  Negative for dysphoric mood and suicidal ideas.        Objective   Vitals:  /70   Pulse 77   Temp 36.3 °C (97.3 °F)   Wt 78.9 kg (174 lb)   SpO2 96%   BMI 31.82 kg/m²       Physical Exam  Constitutional:       Appearance: Normal appearance. She is obese.   HENT:      Head: Normocephalic and atraumatic.      Right Ear: Tympanic membrane and external ear normal.      Left Ear: Tympanic membrane and external ear normal.      Nose: Nose normal.      Mouth/Throat:      Mouth: Mucous membranes are moist.      Pharynx: Oropharynx is clear. No oropharyngeal exudate.   Eyes:      Extraocular Movements: Extraocular movements intact.      Conjunctiva/sclera: Conjunctivae normal.      Pupils: Pupils are equal, round, and reactive to light.   Cardiovascular:      Rate and Rhythm: Normal rate and regular rhythm.      Heart sounds: Normal heart sounds.   Pulmonary:      Effort: Pulmonary effort is normal.      Breath sounds: Normal breath sounds.   Abdominal:      General: Abdomen is flat.      Palpations: Abdomen is soft. There is no mass.      Tenderness: There is no abdominal tenderness. There is no guarding.   Musculoskeletal:      Cervical back: Neck supple.   Lymphadenopathy:      Cervical: No cervical adenopathy.   Skin:     General: Skin is warm and dry.   Neurological:      General: No focal deficit present.      Mental Status: She is alert.   Psychiatric:         Mood and Affect: Mood normal.         Speech: Speech normal.         Behavior: Behavior normal.         Cognition and Memory: Cognition normal.         Assessment & Plan  Elevated alkaline phosphatase level    Orders:    US right upper quadrant; Future     Alkaline Phosphatase, Isoenzymes; Future    Mixed hyperlipidemia    Orders:    Lipid panel; Future    Hyperglycemia    Orders:    Hemoglobin A1C; Future    Adenomatous polyp of colon, unspecified part of colon    Orders:    Colonoscopy Screening; High Risk Patient; polyp; Future    Tubular adenoma of rectum    Orders:    Colonoscopy Screening; High Risk Patient; polyp; Future

## 2025-04-29 NOTE — PATIENT INSTRUCTIONS
It was nice to see you today!  Discussed current concerns and addressed   Reviewed recent labs and diagnostics  Reviewed medications list  Continue to eat a healthy diet, exercise at least 3 times a week or more  Plan and follow up discussed  For any further information related to your condition, copy and paste or go to [email or web address]  Get ultrasound due to recent elevation of alk phos, not sure if induced by chemo/med  Fasting blood   Send for scope

## 2025-05-02 ENCOUNTER — APPOINTMENT (OUTPATIENT)
Facility: CLINIC | Age: 70
End: 2025-05-02
Payer: MEDICARE

## 2025-05-02 VITALS
DIASTOLIC BLOOD PRESSURE: 71 MMHG | SYSTOLIC BLOOD PRESSURE: 136 MMHG | HEART RATE: 78 BPM | HEIGHT: 61 IN | BODY MASS INDEX: 32.85 KG/M2 | WEIGHT: 174 LBS

## 2025-05-02 DIAGNOSIS — Z01.411 ENCOUNTER FOR GYNECOLOGICAL EXAMINATION WITH ABNORMAL FINDING: ICD-10-CM

## 2025-05-02 DIAGNOSIS — N90.4 LICHEN SCLEROSUS OF FEMALE GENITALIA: Primary | ICD-10-CM

## 2025-05-02 PROCEDURE — G2211 COMPLEX E/M VISIT ADD ON: HCPCS | Performed by: OBSTETRICS & GYNECOLOGY

## 2025-05-02 PROCEDURE — 1159F MED LIST DOCD IN RCRD: CPT | Performed by: OBSTETRICS & GYNECOLOGY

## 2025-05-02 PROCEDURE — 3008F BODY MASS INDEX DOCD: CPT | Performed by: OBSTETRICS & GYNECOLOGY

## 2025-05-02 PROCEDURE — 1036F TOBACCO NON-USER: CPT | Performed by: OBSTETRICS & GYNECOLOGY

## 2025-05-02 PROCEDURE — 99214 OFFICE O/P EST MOD 30 MIN: CPT | Performed by: OBSTETRICS & GYNECOLOGY

## 2025-05-02 PROCEDURE — 1123F ACP DISCUSS/DSCN MKR DOCD: CPT | Performed by: OBSTETRICS & GYNECOLOGY

## 2025-05-02 RX ORDER — CLOBETASOL PROPIONATE 0.5 MG/G
OINTMENT TOPICAL 2 TIMES DAILY
Qty: 30 G | Refills: 2 | Status: SHIPPED | OUTPATIENT
Start: 2025-05-02

## 2025-05-02 NOTE — PROGRESS NOTES
Subjective   Joe Taylor is a 70 y.o. female here for GYN care.  She has no postmenopausal bleeding or discharge.  No dysuria or change in bowel habits.  She is current on her colonoscopy from .    She has a history of left breast cancer and thyroid cancer.  She is followed by the breast specialist.   She mentions occasional sweating with exertion.  She has this evaluated through her PCP and endocrinologist.    She does have lichen sclerosis and mentions the symptoms have worsened as the weather has warmed.  She needs a refill of clobetasol ointment.    Personal health questionnaire reviewed: yes.     Gynecologic History  No LMP recorded. Patient is postmenopausal.  Contraception: post menopausal status  Last Pap: 24. Results were: normal  Last mammogram: 24. Results were: normal    Obstetric History  OB History    Para Term  AB Living   1 0 0      SAB IAB Ectopic Multiple Live Births             # Outcome Date GA Lbr Kilo/2nd Weight Sex Type Anes PTL Lv   1                 Objective   Constitutional: Alert and in no acute distress. Well developed, well nourished.   Head and Face: Head and face: Normal.    Eyes: Normal external exam - nonicteric sclera, extraocular movements intact (EOMI) and no ptosis.   Neck: No neck asymmetry. Supple. Thyroid not enlarged and there were no palpable thyroid nodules.    Pulmonary: No respiratory distress.   Chest: Breasts: Normal appearance, no nipple discharge and no skin changes. Palpation of breasts and axillae: No palpable mass and no axillary lymphadenopathy.   Abdomen: Soft nontender; no abdominal mass palpated. No organomegaly. No hernias.   Genitourinary: External genitalia: There are hypopigmented areas around the perirectal area and buttocks, extending to the perineum, posterior fourchette, labia/vulva consistent with lichenoid changes.. No inguinal lymphadenopathy. Bartholin's Urethral and Skenes Glands: Normal. Urethra: Normal.   Bladder: Normal on palpation. Vagina: Normal. Cervix: Normal.  Uterus: Normal.  Right Adnexa/parametria: Normal.  Left Adnexa/parametria: Normal.  Inspection of Perianal Area: Normal.   Musculoskeletal: No joint swelling seen, normal movements of all extremities.   Skin: Normal skin color and pigmentation, normal skin turgor, and no rash.   Neurologic: Non-focal. Grossly intact.   Psychiatric: Alert and oriented x 3. Affect normal to patient baseline. Mood: Appropriate.  Physical Exam     Assessment/Plan   This is a 70-year-old female with skin changes around the vulva labia and perirectal area consistent with lichen sclerosis.  I recommend resuming clobetasol ointment every day for 7 to 10 days, then maintenance is twice weekly thereafter.  In between applications of clobetasol, I recommend use of Aquaphor.    No Pap smear was sent, she is HPV negative in 2015 and had a normal Pap in 2024.  No further Paps are needed.    She is current on her breast imaging with the breast specialist.    I will see her in 1 year.    Education reviewed: self breast exams.  Mammogram ordered.

## 2025-05-07 ENCOUNTER — APPOINTMENT (OUTPATIENT)
Dept: RADIOLOGY | Facility: HOSPITAL | Age: 70
End: 2025-05-07
Payer: MEDICARE

## 2025-05-09 ENCOUNTER — APPOINTMENT (OUTPATIENT)
Dept: RADIOLOGY | Facility: HOSPITAL | Age: 70
End: 2025-05-09
Payer: MEDICARE

## 2025-05-14 ENCOUNTER — APPOINTMENT (OUTPATIENT)
Dept: RADIOLOGY | Facility: HOSPITAL | Age: 70
End: 2025-05-14
Payer: MEDICARE

## 2025-05-20 ENCOUNTER — APPOINTMENT (OUTPATIENT)
Dept: RADIOLOGY | Facility: HOSPITAL | Age: 70
End: 2025-05-20
Payer: MEDICARE

## 2025-05-20 DIAGNOSIS — R74.8 ELEVATED ALKALINE PHOSPHATASE LEVEL: ICD-10-CM

## 2025-05-20 PROCEDURE — 76705 ECHO EXAM OF ABDOMEN: CPT | Performed by: RADIOLOGY

## 2025-05-20 PROCEDURE — 76705 ECHO EXAM OF ABDOMEN: CPT

## 2025-06-13 LAB
ALP BONE CFR SERPL: 57 % (ref 28–66)
ALP INTEST CFR SERPL: 14 % (ref 1–24)
ALP LIVER CFR SERPL: 30 % (ref 25–69)
ALP MACROHEPATIC CFR SERPL: 0 %
ALP PLAC CFR SERPL: 0 %
ALP SERPL-CCNC: 109 U/L (ref 37–153)
CHOLEST SERPL-MCNC: 167 MG/DL
CHOLEST/HDLC SERPL: 2.5 (CALC)
EST. AVERAGE GLUCOSE BLD GHB EST-MCNC: 123 MG/DL
EST. AVERAGE GLUCOSE BLD GHB EST-SCNC: 6.8 MMOL/L
HBA1C MFR BLD: 5.9 %
HDLC SERPL-MCNC: 66 MG/DL
LDLC SERPL CALC-MCNC: 85 MG/DL (CALC)
NONHDLC SERPL-MCNC: 101 MG/DL (CALC)
TRIGL SERPL-MCNC: 73 MG/DL

## 2025-06-25 DIAGNOSIS — E78.5 HYPERLIPIDEMIA, UNSPECIFIED: ICD-10-CM

## 2025-06-26 RX ORDER — ROSUVASTATIN CALCIUM 5 MG/1
5 TABLET, COATED ORAL DAILY
Qty: 90 TABLET | Refills: 3 | Status: SHIPPED | OUTPATIENT
Start: 2025-06-26

## 2025-07-03 ASSESSMENT — ENCOUNTER SYMPTOMS
HEMATOLOGIC/LYMPHATIC NEGATIVE: 1
CARDIOVASCULAR NEGATIVE: 1
RESPIRATORY NEGATIVE: 1
GASTROINTESTINAL NEGATIVE: 1
PSYCHIATRIC NEGATIVE: 1
EYES NEGATIVE: 1
NEUROLOGICAL NEGATIVE: 1
MUSCULOSKELETAL NEGATIVE: 1
ENDOCRINE NEGATIVE: 1
CONSTITUTIONAL NEGATIVE: 1

## 2025-07-03 NOTE — PROGRESS NOTES
Joe Taylor female   1955 70 y.o.   [id number]      Chief Complaint    Follow-up         HPI  Joe Taylor is a 70 y.o.  female [facility] for her left breast cancer.     Breast         AJCC Edition: 8th (AJCC), Diagnosis Date: 13-Mar-2020, IA, pT2 pN0 cM0 G2    Treatment Synopsis:    She has a  left-sided invasive ductal carcinoma, stage IA (T1b N0 M0). The patient's breast cancer was diagnosed on March 13, 2020, and is estrogen receptor positive at >95%, progesterone receptor positive at 90%, and HER-2/chhaya equivocal, her2 not amplified by FISH at 1.7. In addition patient has an oncotype DX recurrence score of 14 translating to a 9-year distant recurrence  rate of 4% with <1% chemotherapy benefit. Details of her history are as follows.      02/21/20220: Bilateral screening mammogram. Showed new spiculated left breast mass.   03/13/2020: Left mammogram with US at 3:00, 9 cm from the nipple, was a 0.5 x 0.6 x 0.7cm mass. Left axilla showed one abnormal looking LN  measuring 2.4 x 0.7 x 1.9cm mass.   03/13/2020: US guided bio[psy of the left breast at 3:00, 9cm from the nipple. Pathology revealed invasive ductal carcinoma with receptors as above. Left axillary LN biopsy did not reveal any malignancy  She had negative genetic testing   04/08/2020: Patient completed a left partial mastectomy with SLNB. Pathology showed a 2.3cm mass, grade 2, with 0/2 SLNs involved.  5/2020  gentic testing via the 34-gene CancerNext panel from [employer].    06/16/2020: Completed radiation   07/01/2020: Started Arimidex switched to Letrozole 12/21 for arthralgias, help 2/2022 and resumes anastrozole May 2022.   12/18/2024 BCI testing shows an overall risk of recurrence 9.8%, and a later recurrence (years 5-10) to be 5.4%. She will NOT benefit with extended thearpy.    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 She was treated with a total thyroidectomy, and no lymph nodes had evidence of metastatic disease. Further treatment was not recommended.     BREAST IMAGIN2024 bilateral screening mammogram, BI-RADS Category 2.    No breast MRI performed.    REPRODUCTIVE HISTORY: menarche age 10, , OCPs x 3 y, menopause age 49, heterogeneously dense breast tissue    FAMILY CANCER HISTORY:   Father: pancreatic cancer age 91  Paternal aunt: breast cancer  Maternal 1st cousin: ovarian cancer 45  Maternal uncle: colon cancer age 65    REVIEW OF SYSTEMS  Review of Systems   Constitutional: Negative.    HENT:  Negative.     Eyes: Negative.    Respiratory: Negative.     Cardiovascular: Negative.    Gastrointestinal: Negative.    Endocrine: Negative.    Genitourinary: Negative.     Musculoskeletal: Negative.    Skin: Negative.    Neurological: Negative.    Hematological: Negative.    Psychiatric/Behavioral: Negative.              MEDICATIONS  Current Outpatient Medications   Medication Instructions    clobetasol (Temovate) 0.05 % ointment Topical, 2 times daily, Apply topically once or twice a day for 1 week, then apply sparingly twice weekly thereafter.    levothyroxine (SYNTHROID, LEVOXYL) 75 mcg, Daily    liothyronine (CYTOMEL) 15 mcg, Daily    propranolol (INDERAL) 10 mg, oral, 2 times daily PRN    propranolol LA (INDERAL LA) 80 mg, oral, Daily    rosuvastatin (CRESTOR) 5 mg, oral, Daily        ALLERGIES  Allergies   Allergen Reactions    Sulfa (Sulfonamide Antibiotics) Swelling and Rash    Codeine Dizziness and Other    Mold Unknown    Tree And Shrub Pollen Unknown        SOCIAL HISTORY    Family History   Problem Relation Name Age of Onset    Stroke Mother Michelle Taylor     Anesthesia related problems Mother Michelle Taylor     Arthritis Mother Michelle Taylor     Anesthesia problems Mother  Michelle Taylor     Thyroid disease Mother Michelle Taylor     Aneurysm Father Patricio Taylor     Arthritis Father Patricio Reyeszian     Cancer Father Patricio Taylor     Heart disease Father Patricio Reyeszian     Hyperlipidemia Father Patricio Taylor     Colon cancer Mother's Brother Nolan Brady     Breast cancer Father's Sister Esther          Social History     Tobacco Use    Smoking status: Former     Current packs/day: 0.00     Average packs/day: 1 pack/day for 5.0 years (5.0 ttl pk-yrs)     Types: Cigarettes     Quit date: 1978     Years since quittin.2     Passive exposure: Past    Smokeless tobacco: Never    Tobacco comments:     Only smoked in college   Substance Use Topics    Alcohol use: Not Currently     Comment: I get flushed and heart races with one small drink        VITALS  Vitals:    25 0837   BP: 121/77   Pulse: 72   Temp: 36.7 °C (98 °F)   SpO2: 96%          PHYSICAL EXAM  Patient is alert and oriented x3, with appropriate mood. The gait is steady and hand grasps are equal. Sclera clear. The left breast with superolateral radial scar lumpectomy incision and left axillary incision. The tissue is soft without palpable abnormalities, discrete nodules or masses. The skin and nipples appear normal, left inverted. There is no cervical, supraclavicular, or axillary lymphadenopathy palpable. Heart rate and rhythm normal, S1 and S2 appreciated. The lungs are clear bilaterally.     Physical Exam  Chest:              IMAGING  Screening mammogram today, results are pending.   Time was spent viewing digital images of the radiology testing with the patient.     ORDERS  Orders Placed This Encounter   Procedures    BI mammo bilateral screening tomosynthesis     Standing Status:   Future     Expected Date:   2026     Expiration Date:   9/3/2026     Perform a breast ultrasound if clinically indicated by Radiologist?:   Yes     Previous Mamm performed at  location?:   Yes      Reason for exam::   .     Radiologist to Determine Optimal Study:   Yes     Release result to [employer]:   Immediate     Is this exam part of a Research Study? If Yes, link this order to the research study:   No         ASSESSMENT/PLAN  1. Encounter for screening mammogram for malignant neoplasm of breast  BI mammo bilateral screening tomosynthesis      2. Encounter for follow-up surveillance of breast cancer  Clinic Appointment Request             Follow up for your breast health by return to PCP.   Thank you for coming to see me today at [facility]. Your clinical examination and imaging is normal. You no longer need to be seen by a surgical breast specialist. It is important to continue annual screening mammograms & clinical breast exams. Please return to see me if you have a new breast problem or abnormal mammogram. It has been a pleasure having you as a patient.     You can see your health information, review clinical summaries from office visits & test results online when you follow your health with MY  Chart, a personal health record. To sign up go to [email or web address]. If you need assistance with signing up or trouble getting into your account call [employer] Patient Line 24/7 at [phone].     My office phone number is [phone] if you need to get in touch with me or have additional questions or problems. Thank you for choosing [facility] and trusting me as your healthcare provider. I am honored to be a provider on your health care team and I remain dedicated to helping you achieve your health goals.           Robyn Lamb, YASSINE-CNP  [facility]

## 2025-07-07 ENCOUNTER — OFFICE VISIT (OUTPATIENT)
Dept: SURGICAL ONCOLOGY | Facility: CLINIC | Age: 70
End: 2025-07-07
Payer: MEDICARE

## 2025-07-07 ENCOUNTER — HOSPITAL ENCOUNTER (OUTPATIENT)
Dept: RADIOLOGY | Facility: CLINIC | Age: 70
Discharge: HOME | End: 2025-07-07
Payer: MEDICARE

## 2025-07-07 VITALS
DIASTOLIC BLOOD PRESSURE: 77 MMHG | HEART RATE: 72 BPM | WEIGHT: 177 LBS | OXYGEN SATURATION: 96 % | TEMPERATURE: 98 F | BODY MASS INDEX: 33.44 KG/M2 | SYSTOLIC BLOOD PRESSURE: 121 MMHG

## 2025-07-07 DIAGNOSIS — Z85.3 ENCOUNTER FOR FOLLOW-UP SURVEILLANCE OF BREAST CANCER: ICD-10-CM

## 2025-07-07 DIAGNOSIS — Z08 ENCOUNTER FOR FOLLOW-UP SURVEILLANCE OF BREAST CANCER: ICD-10-CM

## 2025-07-07 DIAGNOSIS — Z00.00 HEALTHCARE MAINTENANCE: ICD-10-CM

## 2025-07-07 DIAGNOSIS — Z12.31 ENCOUNTER FOR SCREENING MAMMOGRAM FOR MALIGNANT NEOPLASM OF BREAST: Primary | ICD-10-CM

## 2025-07-07 PROCEDURE — 1160F RVW MEDS BY RX/DR IN RCRD: CPT | Performed by: NURSE PRACTITIONER

## 2025-07-07 PROCEDURE — 1036F TOBACCO NON-USER: CPT | Performed by: NURSE PRACTITIONER

## 2025-07-07 PROCEDURE — 99214 OFFICE O/P EST MOD 30 MIN: CPT | Performed by: NURSE PRACTITIONER

## 2025-07-07 PROCEDURE — 1159F MED LIST DOCD IN RCRD: CPT | Performed by: NURSE PRACTITIONER

## 2025-07-07 PROCEDURE — 77063 BREAST TOMOSYNTHESIS BI: CPT | Performed by: STUDENT IN AN ORGANIZED HEALTH CARE EDUCATION/TRAINING PROGRAM

## 2025-07-07 PROCEDURE — 1126F AMNT PAIN NOTED NONE PRSNT: CPT | Performed by: NURSE PRACTITIONER

## 2025-07-07 PROCEDURE — 77067 SCR MAMMO BI INCL CAD: CPT | Performed by: STUDENT IN AN ORGANIZED HEALTH CARE EDUCATION/TRAINING PROGRAM

## 2025-07-07 PROCEDURE — 77067 SCR MAMMO BI INCL CAD: CPT

## 2025-07-07 ASSESSMENT — PAIN SCALES - GENERAL: PAINLEVEL_OUTOF10: 0-NO PAIN

## 2026-04-13 ENCOUNTER — APPOINTMENT (OUTPATIENT)
Dept: PRIMARY CARE | Facility: CLINIC | Age: 71
End: 2026-04-13
Payer: MEDICARE

## 2026-05-04 ENCOUNTER — APPOINTMENT (OUTPATIENT)
Facility: CLINIC | Age: 71
End: 2026-05-04
Payer: MEDICARE

## 2026-07-08 ENCOUNTER — APPOINTMENT (OUTPATIENT)
Dept: RADIOLOGY | Facility: CLINIC | Age: 71
End: 2026-07-08
Payer: MEDICARE